# Patient Record
Sex: FEMALE | Race: WHITE | NOT HISPANIC OR LATINO | Employment: OTHER | URBAN - METROPOLITAN AREA
[De-identification: names, ages, dates, MRNs, and addresses within clinical notes are randomized per-mention and may not be internally consistent; named-entity substitution may affect disease eponyms.]

---

## 2018-03-06 ENCOUNTER — LAB (OUTPATIENT)
Dept: OTHER | Facility: HOSPITAL | Age: 63
End: 2018-03-06

## 2018-03-06 ENCOUNTER — CONSULT (OUTPATIENT)
Dept: ONCOLOGY | Facility: CLINIC | Age: 63
End: 2018-03-06

## 2018-03-06 VITALS
BODY MASS INDEX: 22.28 KG/M2 | WEIGHT: 164.5 LBS | SYSTOLIC BLOOD PRESSURE: 151 MMHG | DIASTOLIC BLOOD PRESSURE: 80 MMHG | TEMPERATURE: 99.2 F | RESPIRATION RATE: 14 BRPM | HEIGHT: 72 IN | OXYGEN SATURATION: 97 % | HEART RATE: 85 BPM

## 2018-03-06 DIAGNOSIS — R89.9 ABNORMAL LABORATORY TEST: Primary | ICD-10-CM

## 2018-03-06 DIAGNOSIS — Z85.3 HISTORY OF BREAST CANCER: Primary | ICD-10-CM

## 2018-03-06 LAB
BASOPHILS # BLD AUTO: 0.04 10*3/MM3 (ref 0–0.2)
BASOPHILS NFR BLD AUTO: 0.4 % (ref 0–1.5)
DEPRECATED RDW RBC AUTO: 42.2 FL (ref 37–54)
EOSINOPHIL # BLD AUTO: 0.14 10*3/MM3 (ref 0–0.7)
EOSINOPHIL NFR BLD AUTO: 1.5 % (ref 0.3–6.2)
ERYTHROCYTE [DISTWIDTH] IN BLOOD BY AUTOMATED COUNT: 12.7 % (ref 11.7–13)
HCT VFR BLD AUTO: 46.5 % (ref 35.6–45.5)
HGB BLD-MCNC: 15.8 G/DL (ref 11.9–15.5)
IMM GRANULOCYTES # BLD: 0.06 10*3/MM3 (ref 0–0.03)
IMM GRANULOCYTES NFR BLD: 0.7 % (ref 0–0.5)
LYMPHOCYTES # BLD AUTO: 2.27 10*3/MM3 (ref 0.9–4.8)
LYMPHOCYTES NFR BLD AUTO: 24.8 % (ref 19.6–45.3)
MCH RBC QN AUTO: 30.8 PG (ref 26.9–32)
MCHC RBC AUTO-ENTMCNC: 34 G/DL (ref 32.4–36.3)
MCV RBC AUTO: 90.6 FL (ref 80.5–98.2)
MONOCYTES # BLD AUTO: 0.68 10*3/MM3 (ref 0.2–1.2)
MONOCYTES NFR BLD AUTO: 7.4 % (ref 5–12)
NEUTROPHILS # BLD AUTO: 5.97 10*3/MM3 (ref 1.9–8.1)
NEUTROPHILS NFR BLD AUTO: 65.2 % (ref 42.7–76)
NRBC BLD MANUAL-RTO: 0 /100 WBC (ref 0–0)
PLATELET # BLD AUTO: 240 10*3/MM3 (ref 140–500)
PMV BLD AUTO: 10.9 FL (ref 6–12)
RBC # BLD AUTO: 5.13 10*6/MM3 (ref 3.9–5.2)
WBC NRBC COR # BLD: 9.16 10*3/MM3 (ref 4.5–10.7)

## 2018-03-06 PROCEDURE — 36415 COLL VENOUS BLD VENIPUNCTURE: CPT

## 2018-03-06 PROCEDURE — 85025 COMPLETE CBC W/AUTO DIFF WBC: CPT | Performed by: INTERNAL MEDICINE

## 2018-03-06 PROCEDURE — 99205 OFFICE O/P NEW HI 60 MIN: CPT | Performed by: INTERNAL MEDICINE

## 2018-03-06 RX ORDER — LEVOTHYROXINE SODIUM 88 UG/1
TABLET ORAL
COMMUNITY
Start: 2017-02-13

## 2018-03-06 RX ORDER — ANASTROZOLE 1 MG/1
1 TABLET ORAL DAILY
Qty: 90 TABLET | Refills: 1 | Status: SHIPPED | OUTPATIENT
Start: 2018-03-06 | End: 2018-10-03 | Stop reason: SINTOL

## 2018-03-06 RX ORDER — GABAPENTIN 300 MG/1
CAPSULE ORAL
COMMUNITY
Start: 2017-02-13

## 2018-03-06 RX ORDER — ASCORBIC ACID 1000 MG
TABLET ORAL
COMMUNITY

## 2018-03-06 RX ORDER — RAMIPRIL 2.5 MG/1
CAPSULE ORAL
COMMUNITY
Start: 2017-12-25

## 2018-03-06 RX ORDER — ANASTROZOLE 1 MG/1
1 TABLET ORAL
COMMUNITY
Start: 2017-05-10 | End: 2018-03-06 | Stop reason: SDUPTHER

## 2018-03-06 RX ORDER — BISOPROLOL FUMARATE 10 MG/1
TABLET, FILM COATED ORAL
COMMUNITY
Start: 2017-12-25

## 2018-03-06 RX ORDER — MONTELUKAST SODIUM 10 MG/1
TABLET ORAL
COMMUNITY
Start: 2017-02-13

## 2018-03-06 RX ORDER — DULOXETIN HYDROCHLORIDE 60 MG/1
CAPSULE, DELAYED RELEASE ORAL
COMMUNITY
Start: 2017-04-14 | End: 2018-10-03 | Stop reason: SDUPTHER

## 2018-03-06 RX ORDER — CYANOCOBALAMIN 1000 UG/ML
1000 INJECTION, SOLUTION INTRAMUSCULAR; SUBCUTANEOUS
COMMUNITY
Start: 2014-06-24

## 2018-03-06 RX ORDER — BACLOFEN 10 MG/1
TABLET ORAL
COMMUNITY
Start: 2017-12-25

## 2018-03-06 NOTE — PROGRESS NOTES
Subjective     REASON FOR CONSULTATION:  Potential transfer care-stage I breast cancer  Provide an opinion on any further workup or treatment                             REQUESTING PHYSICIAN:  Sandra Horta M.D.    RECORDS OBTAINED:  Records of the patients history including those obtained from the referring provider were reviewed and summarized in detail.    HISTORY OF PRESENT ILLNESS:  The patient is a 62 y.o. year old female who is here for an opinion about the above issue.    History of Present Illness      The patient is a 62-year-old female who presents for reevaluation of a history of early stage breast carcinoma.  She undergone studies in late January 2017 findings of a focal asymmetry in the upper outer quadrant of the right breast.  On February 07, 2017 diagnostic mammogram and ultrasound were performed and right breast core biopsy was obtained with invasive grade 2 mammary carcinoma (scirrhous type) focally with lobular pattern infiltration without associated in situ component, maximum extent was 12 mm ER +90%, SC at 90%, Ki-67 of 2% HER-2 negative.  The tumor morphology was consistent with breast carcinoma arising in a scar.  She had subsequent bilateral breast MRI showing irregular mass in the upper outer quadrant of the right breast measuring 15 x 11 x 18 mm, no other evidence of involvement present, no abnormalities on the left  March 31, 2017 she underwent a right breast lumpectomy with sentinel lymph node biopsy.  Final pathology included a 15 mm infiltrating moderately differentiated ductal adenocarcinoma without in situ component, margins negative, 2 of 2 lymph nodes negative for carcinoma-tumor stage [id number].  She was seen by medical oncology and further Oncotype DX testing returned with a low score of 13 with anastrozole initiated May 2017 and radiation therapy completed June 26, 2017.    The patient was advised per the use of Oncotype DX, Arimidex thereafter initiated May 2017.  Additional  comorbidities included fibromyalgia, long-standing neuropathy, general debilitation as well as obesity.  Further testing included bone density of lumbar spine and bilateral hips performed 2017 with average values and no evidence of osteopenia or osteoporosis noted.  Additionally the patient underwent ipayckxvikq-smslfulgf-Mbhyivye 02, 2018 without new abnormalities noted.    She presents wishing, potentially, to transfer care.    Past Medical History:   Diagnosis Date   • Arthritis    • Cancer 2017    Right breast   • Fibromyalgia    • GERD (gastroesophageal reflux disease)    • H/O Colon polyp    • H/O Disorder of bile duct    • H/O Intractable migraine without aura    • H/O MVP (mitral valve prolapse), mild    • H/O Nonalcoholic fatty liver disease    • H/O Obesity    • H/O Shingles 2013    Right labial   • H/O Tachycardia    • Hiatal hernia    • Hyperlipidemia    • Hypertension    • Hypothyroidism    • Neuropathy    • PONV (postoperative nausea and vomiting)        Reproductive history:  , miscarriage at age 17, first menstrual period at age 11, postmenopausal age 43, positive hormone replacement therapy, prolonged course of vaginal estrogen  Past Surgical History:   Procedure Laterality Date   • APPENDECTOMY     • BACK SURGERY      Tumor   • BREAST SURGERY Right     Cyst excision   • BREAST SURGERY Right     Aspiration   • BREAST SURGERY Right 2017    Lumpectomy   • CHOLECYSTECTOMY     • COLONOSCOPY       normal; 2015   • ENDOSCOPY     • WISDOM TOOTH EXTRACTION     • WRIST SURGERY          No current outpatient prescriptions on file prior to visit.     No current facility-administered medications on file prior to visit.         ALLERGIES:    Allergies   Allergen Reactions   • Furosemide Hives and Swelling   • Sulfa Antibiotics Hives and Swelling   • Topiramate Hives and Swelling   • Adhesive Tape Rash     PAPER TAPE OKAY PER PT.   • Hydrochlorothiazide Rash   • Shellfish-Derived  "Products Rash        Social History     Social History   • Marital status: Single     Social History Main Topics   • Smoking status: Former Smoker     Packs/day: 1.00     Years: 10.00     Types: Cigarettes     Quit date: 3/23/1982   • Smokeless tobacco: Never Used        Family History   Problem Relation Age of Onset   • Diabetes Father    • Heart disease Father    • Hypertension Father    • Colon cancer Maternal Aunt    • Thyroid disease Paternal Uncle    • Breast cancer Maternal Grandmother         Review of Systems   Constitutional: Negative for chills, fever, malaise/fatigue and weight loss.   HENT: Negative for congestion and sore throat.   Eyes: Negative for blurred vision and double vision.   Respiratory: Negative for cough, shortness of breath and wheezing.   Cardiovascular: Negative for chest pain, orthopnea and leg swelling.   Gastrointestinal: Negative for abdominal pain, nausea and vomiting.   Genitourinary: Negative for dysuria, frequency and urgency.   Musculoskeletal: Positive for back pain, myalgias and neck pain.   Skin: Erythematous,?  Heat rash under both breasts.                          Neurological: Negative for dizziness, sensory change, focal weakness and headaches.   Endo/Heme/Allergies: Does not bruise/bleed easily.   Psychiatric/Behavioral: Negative for depression. The patient is mildly nervous/anxious.     Objective     Vitals:    03/06/18 1514   BP: 151/80   Pulse: 85   Resp: 14   Temp: 99.2 °F (37.3 °C)   TempSrc: Oral   SpO2: 97%   Weight: 74.6 kg (164 lb 8 oz)   Height: 190.3 cm (74.92\")   PainSc: 2  Comment: BACK PAIN     Current Status 3/6/2018   ECOG score 0       Physical Exam    Constitutional: She is oriented to person, place, and time. She appears well-developed and well-nourished. No distress.   Animated  female in no distress   HENT:   Head: Normocephalic and atraumatic.   Eyes: Conjunctivae are normal. Pupils are equal, round, and reactive to light. No scleral " icterus.   Neck: Normal range of motion. No tracheal deviation present.   Pulmonary/Chest: Effort normal.   Breasts: Erythematous, macular rash under each breast bilaterally right greater than left   Musculoskeletal: Normal range of motion. She exhibits no edema or tenderness.   Lymphadenopathy:   She has no cervical adenopathy.   Neurological: She is alert and oriented to person, place, and time. No cranial nerve deficit.   Skin: Skin is warm and dry. No rash noted. No erythema    RECENT LABS:  Hematology WBC   Date Value Ref Range Status   03/06/2018 9.16 4.50 - 10.70 10*3/mm3 Final     RBC   Date Value Ref Range Status   03/06/2018 5.13 3.90 - 5.20 10*6/mm3 Final     Hemoglobin   Date Value Ref Range Status   03/06/2018 15.8 (H) 11.9 - 15.5 g/dL Final     Hematocrit   Date Value Ref Range Status   03/06/2018 46.5 (H) 35.6 - 45.5 % Final     Platelets   Date Value Ref Range Status   03/06/2018 240 140 - 500 10*3/mm3 Final          Assessment/Plan   ASSESSMENT:62-year-old female who presents with past medical history inclusive of GE reflux, apparent bile duct disorder, apparent ANTUNEZ, obesity, hyperlipidemia, hypertension, hypothyroidism, fibromyalgia and more recent diagnosis in 2017 -  · Stage I (T1CN 0) hormone receptor positive, HER-2/jerome negative right breast cancer. The patient was treated with breast conserving surgical procedure and subsequent radiotherapy.  Subsequent Oncotype DX testing turned a low risk score of 13 and she was treated with Arimidex initiated May 2017.  She presents for potential transfer care and we've discussed several issues including antifungal treatment for under breast rash bilaterally, continued use of Arimidex at this point but she is having apparent development of muscle skeletal discomfort that may well be related to the medications use and also estrogen deprivation symptoms in particular vaginal dryness . I believe that she's been treated properly with Arimidex at this point and  have asked her to continue the medication and we may change to an alternative therapy if her muscle several pain worsens.  As to her vaginal dryness I have no objection to the use of vaginal estrogen therapy. As I understand thee issue data do not show an increased risk of cancer recurrence among women currently undergoing treatment for breast cancer or those with a personal history of breast cancer who use vaginal estrogen to relieve urogenital symptoms.  She is to see gynecology concerning this in the next 1-2 months and will need to undergo an examination initially.  After discussion approximately an hour we plan:  1.  Continue her current Arimidex dosing  2.  Follow-up GYN assessment in 1-2 months for a scheduled  3.  The patient will contact me if her musculoskeletal symptoms worsen prior to next appointment  4.  Last bone density was August 2017 without evidence of osteopenia or osteoporosis  5.  Follow-up appointment in 3 months with CBC, CMP

## 2018-04-18 ENCOUNTER — DOCUMENTATION (OUTPATIENT)
Dept: ONCOLOGY | Facility: CLINIC | Age: 63
End: 2018-04-18

## 2018-04-18 NOTE — PROGRESS NOTES
The pt was seen at [place] on 3/6/18 by Dr. Mello for an initial medical oncology consultation for treated stage 1 breast cancer. The pt potentially wished to transfer care to [facility]. The pt completed the Distress Questionnaire and scored 1/10. Currently, the pt does not have a living will scanned into her medical record.     Cari Bradford [facility]  Oncology Social Worker

## 2018-05-28 NOTE — PROGRESS NOTES
Subjective still having difficulty with vaginal dryness and now with hot flashes.    REASON FOR CONSULTATION:  Potential transfer care-stage I breast cancer  Provide an opinion on any further workup or treatment                             REQUESTING PHYSICIAN:  Sandra Horta M.D.      History of Present Illness      The patient is a 62-year-old female who presents for reevaluation of a history of early stage breast carcinoma.  She undergone studies in late January 2017 findings of a focal asymmetry in the upper outer quadrant of the right breast.  On February 07, 2017 diagnostic mammogram and ultrasound were performed and right breast core biopsy was obtained with invasive grade 2 mammary carcinoma (scirrhous type) focally with lobular pattern infiltration without associated in situ component, maximum extent was 12 mm ER +90%, NM at 90%, Ki-67 of 2% HER-2 negative.  The tumor morphology was consistent with breast carcinoma arising in a scar.  She had subsequent bilateral breast MRI showing irregular mass in the upper outer quadrant of the right breast measuring 15 x 11 x 18 mm, no other evidence of involvement present, no abnormalities on the left  March 31, 2017 she underwent a right breast lumpectomy with sentinel lymph node biopsy.  Final pathology included a 15 mm infiltrating moderately differentiated ductal adenocarcinoma without in situ component, margins negative, 2 of 2 lymph nodes negative for carcinoma-tumor stage [id number].  She was seen by medical oncology and further Oncotype DX testing returned with a low score of 13 with anastrozole initiated May 2017 and radiation therapy completed June 26, 2017.    The patient was advised per the use of Oncotype DX, Arimidex thereafter initiated May 2017.  Additional comorbidities included fibromyalgia, long-standing neuropathy, general debilitation as well as obesity.  Further testing included bone density of lumbar spine and bilateral hips performed August  2017 with average values and no evidence of osteopenia or osteoporosis noted.  Additionally the patient underwent roptfjbzbag-jqtbywzvb-Tygazlab 02, 2018 without new abnormalities noted.    She presents wishing, potentially, to transfer care.    Patient now presents doing well except for hot flashes on Arimidex.  She is also having issues, continually, with vaginal dryness.  She has just started vaginal estrogens and hopes for better effect as she uses samples from her gynecologist.  She believes her hot flashes are reasonably manageable thus far    Past Medical History:   Diagnosis Date   • Arthritis    • Cancer 2017    Right breast   • Fibromyalgia    • GERD (gastroesophageal reflux disease)    • H/O Colon polyp    • H/O Disorder of bile duct    • H/O Intractable migraine without aura    • H/O MVP (mitral valve prolapse), mild    • H/O Nonalcoholic fatty liver disease    • H/O Obesity    • H/O Shingles 2013    Right labial   • H/O Tachycardia    • Hiatal hernia    • History of breast cancer 3/6/2018   • History of infectious mononucleosis    • Hyperlipidemia    • Hypertension    • Hypothyroidism    • Neuropathy    • PONV (postoperative nausea and vomiting)        Reproductive history:  , miscarriage at age 17, first menstrual period at age 11, postmenopausal age 43, positive hormone replacement therapy, prolonged course of vaginal estrogen  Past Surgical History:   Procedure Laterality Date   • APPENDECTOMY     • BACK SURGERY      Tumor   • BREAST SURGERY Right     Cyst excision   • BREAST SURGERY Right     Aspiration   • BREAST SURGERY Right 2017    Lumpectomy   • CHOLECYSTECTOMY     • COLONOSCOPY       normal; 2015   • ENDOSCOPY     • WISDOM TOOTH EXTRACTION     • WRIST SURGERY          Current Outpatient Prescriptions on File Prior to Visit   Medication Sig Dispense Refill   • anastrozole (ARIMIDEX) 1 MG tablet Take 1 tablet by mouth Daily. 90 tablet 1   • baclofen (LIORESAL) 10 MG  tablet TAKE 1 TABLET BY MOUTH THREE TIMES DAILY     • bisoprolol (ZEBeta) 10 MG tablet TAKE 1 TABLET BY MOUTH DAILY     • Coenzyme Q10 (CO Q 10) 10 MG capsule Take  by mouth.     • cyanocobalamin 1000 MCG/ML injection Inject 1,000 mcg under the skin.     • DULoxetine (CYMBALTA) 60 MG capsule TAKE 1 CAPSULE BY MOUTH EVERY DAY     • gabapentin (NEURONTIN) 300 MG capsule TAKE 1 CAPSULE BY MOUTH TWICE DAILY     • levothyroxine (SYNTHROID, LEVOTHROID) 88 MCG tablet TAKE 1 TABLET BY MOUTH EVERY DAY     • MAGNESIUM PO Take  by mouth.     • montelukast (SINGULAIR) 10 MG tablet TAKE 1 TABLET BY MOUTH EVERY NIGHT AT BEDTIME     • Multiple Vitamins-Minerals (WOMENS MULTIVITAMIN PO) Take  by mouth Daily.     • NON FORMULARY Omega 7     • Omega-3 Fatty Acids (OMEGA 3 PO) Take  by mouth Daily.     • Probiotic Product (PROBIOTIC DAILY PO) Take  by mouth.     • ramipril (ALTACE) 2.5 MG capsule TAKE 1 CAPSULE BY MOUTH EVERY DAY       No current facility-administered medications on file prior to visit.         ALLERGIES:    Allergies   Allergen Reactions   • Furosemide Hives and Swelling   • Sulfa Antibiotics Hives and Swelling   • Topiramate Hives and Swelling   • Adhesive Tape Rash     PAPER TAPE OKAY PER PT.   • Hydrochlorothiazide Rash   • Shellfish-Derived Products Rash        Social History     Social History   • Marital status: Single   • Number of children: 0   • Years of education: Associate Degree, 2 yrs     Occupational History   • ; ; Dental Assistant//  Disabled     Social History Main Topics   • Smoking status: Former Smoker     Packs/day: 1.00     Years: 10.00     Types: Cigarettes     Quit date: 3/23/1982   • Smokeless tobacco: Never Used   • Alcohol use No   • Drug use: No     Other Topics Concern   • Not on file        Family History   Problem Relation Age of Onset   • Breast cancer Mother         In her 50s and 80s   • Diabetes Mother         Type 2   •  "Dementia Mother    • Diabetes Father         Type 2   • Heart disease Father    • Hypertension Father    • Colon cancer Maternal Aunt    • Thyroid disease Paternal Uncle    • Breast cancer Maternal Grandmother    • Cancer Paternal Grandmother    • Dementia Paternal Grandmother         Review of Systems   Constitutional: Negative for chills, fever, malaise/fatigue and weight loss.   HENT: Negative for congestion and sore throat.   Eyes: Negative for blurred vision and double vision.   Respiratory: Negative for cough, shortness of breath and wheezing.   Cardiovascular: Negative for chest pain, orthopnea and leg swelling.   Gastrointestinal: Negative for abdominal pain, nausea and vomiting.   Genitourinary: Negative for dysuria, frequency and urgency.   Musculoskeletal: Positive for back pain, myalgias and neck pain.   Skin: Erythematous,?  Heat rash under both breasts.                          Neurological: Negative for dizziness, sensory change, focal weakness and headaches.   Endo/Heme/Allergies: Does not bruise/bleed easily.   Psychiatric/Behavioral: Negative for depression. The patient is mildly nervous/anxious.     Objective     Vitals:    05/29/18 1639   BP: 144/85   Pulse: 69   Resp: 16   Temp: 99 °F (37.2 °C)   TempSrc: Oral   SpO2: 99%   Weight: 83 kg (183 lb)   Height: 190.3 cm (74.92\")   PainSc: 0-No pain     Current Status 5/29/2018   ECOG score 0       Physical Exam    Constitutional: She is oriented to person, place, and time. She appears well-developed and well-nourished. No distress.   Animated  female in no distress   HENT:   Head: Normocephalic and atraumatic.   Eyes: Conjunctivae are normal. Pupils are equal, round, and reactive to light. No scleral icterus.   Neck: Normal range of motion. No tracheal deviation present.   Pulmonary/Chest: Effort normal.   Breasts: Erythematous, macular rash under each breast bilaterally right greater than left   Musculoskeletal: Normal range of motion. She " exhibits no edema or tenderness.   Lymphadenopathy:   She has no cervical adenopathy.   Neurological: She is alert and oriented to person, place, and time. No cranial nerve deficit.   Skin: Skin is warm and dry. No rash noted. No erythema    RECENT LABS:  Hematology WBC   Date Value Ref Range Status   05/29/2018 7.40 4.50 - 10.70 10*3/mm3 Final     RBC   Date Value Ref Range Status   05/29/2018 4.88 3.90 - 5.20 10*6/mm3 Final     Hemoglobin   Date Value Ref Range Status   05/29/2018 14.9 11.9 - 15.5 g/dL Final     Hematocrit   Date Value Ref Range Status   05/29/2018 44.5 35.6 - 45.5 % Final     Platelets   Date Value Ref Range Status   05/29/2018 245 140 - 500 10*3/mm3 Final          Assessment/Plan   ASSESSMENT:62-year-old female who presents with past medical history inclusive of GE reflux, apparent bile duct disorder, apparent ANTUNEZ, obesity, hyperlipidemia, hypertension, hypothyroidism, fibromyalgia and more recent diagnosis in 2017 -  · Stage I (T1CN 0) hormone receptor positive, HER-2/jerome negative right breast cancer. The patient was treated with breast conserving surgical procedure and subsequent radiotherapy.  Subsequent Oncotype DX testing turned a low risk score of 13 and she was treated with Arimidex initiated May 2017.  She presents for potential transfer care and we've discussed several issues including antifungal treatment for under breast rash bilaterally, continued use of Arimidex at this point but she is having apparent development of muscle skeletal discomfort that may well be related to the medications use and also estrogen deprivation symptoms in particular vaginal dryness . I believe that she's been treated properly with Arimidex at this point and have asked her to continue the medication and we may change to an alternative therapy if her muscle several pain worsens.  As to her vaginal dryness I have no objection to the use of vaginal estrogen therapy. As I understand thee issue data do not show  an increased risk of cancer recurrence among women currently undergoing treatment for breast cancer or those with a personal history of breast cancer who use vaginal estrogen to relieve urogenital symptoms.  She is to see gynecology concerning this in the next 1-2 months and will need to undergo an examination initially.  After discussion approximately an hour we went on to continue her Arimidex dosing and have her seen by by gynecology.  She is currently going to continue a trial of vaginal estrogens and encouraged her to obtain more samples as cost seems to be prohibitive.  She is tolerating Arimidex fairly well and will continue same with her last bone density obtained August 2017.  She'll not be seen back in 4 months.

## 2018-05-29 ENCOUNTER — OFFICE VISIT (OUTPATIENT)
Dept: ONCOLOGY | Facility: CLINIC | Age: 63
End: 2018-05-29

## 2018-05-29 ENCOUNTER — LAB (OUTPATIENT)
Dept: OTHER | Facility: HOSPITAL | Age: 63
End: 2018-05-29

## 2018-05-29 VITALS
HEIGHT: 72 IN | DIASTOLIC BLOOD PRESSURE: 85 MMHG | WEIGHT: 183 LBS | HEART RATE: 69 BPM | SYSTOLIC BLOOD PRESSURE: 144 MMHG | BODY MASS INDEX: 24.79 KG/M2 | OXYGEN SATURATION: 99 % | RESPIRATION RATE: 16 BRPM | TEMPERATURE: 99 F

## 2018-05-29 DIAGNOSIS — Z85.3 HISTORY OF BREAST CANCER: Primary | ICD-10-CM

## 2018-05-29 DIAGNOSIS — Z85.3 HISTORY OF BREAST CANCER: ICD-10-CM

## 2018-05-29 LAB
ALBUMIN SERPL-MCNC: 4.5 G/DL (ref 3.5–5.2)
ALBUMIN/GLOB SERPL: 1.5 G/DL
ALP SERPL-CCNC: 143 U/L (ref 39–117)
ALT SERPL W P-5'-P-CCNC: 64 U/L (ref 1–33)
ANION GAP SERPL CALCULATED.3IONS-SCNC: 12 MMOL/L
AST SERPL-CCNC: 41 U/L (ref 1–32)
BASOPHILS # BLD AUTO: 0.03 10*3/MM3 (ref 0–0.2)
BASOPHILS NFR BLD AUTO: 0.4 % (ref 0–1.5)
BILIRUB SERPL-MCNC: 0.3 MG/DL (ref 0.1–1.2)
BUN BLD-MCNC: 13 MG/DL (ref 8–23)
BUN/CREAT SERPL: 18.6 (ref 7–25)
CALCIUM SPEC-SCNC: 10.1 MG/DL (ref 8.6–10.5)
CHLORIDE SERPL-SCNC: 106 MMOL/L (ref 98–107)
CO2 SERPL-SCNC: 22 MMOL/L (ref 22–29)
CREAT BLD-MCNC: 0.7 MG/DL (ref 0.57–1)
DEPRECATED RDW RBC AUTO: 42.1 FL (ref 37–54)
EOSINOPHIL # BLD AUTO: 0.12 10*3/MM3 (ref 0–0.7)
EOSINOPHIL NFR BLD AUTO: 1.6 % (ref 0.3–6.2)
ERYTHROCYTE [DISTWIDTH] IN BLOOD BY AUTOMATED COUNT: 12.6 % (ref 11.7–13)
GFR SERPL CREATININE-BSD FRML MDRD: 85 ML/MIN/1.73
GLOBULIN UR ELPH-MCNC: 3.1 GM/DL
GLUCOSE BLD-MCNC: 99 MG/DL (ref 65–99)
HCT VFR BLD AUTO: 44.5 % (ref 35.6–45.5)
HGB BLD-MCNC: 14.9 G/DL (ref 11.9–15.5)
IMM GRANULOCYTES # BLD: 0.04 10*3/MM3 (ref 0–0.03)
IMM GRANULOCYTES NFR BLD: 0.5 % (ref 0–0.5)
LYMPHOCYTES # BLD AUTO: 1.89 10*3/MM3 (ref 0.9–4.8)
LYMPHOCYTES NFR BLD AUTO: 25.5 % (ref 19.6–45.3)
MCH RBC QN AUTO: 30.5 PG (ref 26.9–32)
MCHC RBC AUTO-ENTMCNC: 33.5 G/DL (ref 32.4–36.3)
MCV RBC AUTO: 91.2 FL (ref 80.5–98.2)
MONOCYTES # BLD AUTO: 0.5 10*3/MM3 (ref 0.2–1.2)
MONOCYTES NFR BLD AUTO: 6.8 % (ref 5–12)
NEUTROPHILS # BLD AUTO: 4.82 10*3/MM3 (ref 1.9–8.1)
NEUTROPHILS NFR BLD AUTO: 65.2 % (ref 42.7–76)
NRBC BLD MANUAL-RTO: 0 /100 WBC (ref 0–0)
PLATELET # BLD AUTO: 245 10*3/MM3 (ref 140–500)
PMV BLD AUTO: 10.8 FL (ref 6–12)
POTASSIUM BLD-SCNC: 4.1 MMOL/L (ref 3.5–5.2)
PROT SERPL-MCNC: 7.6 G/DL (ref 6–8.5)
RBC # BLD AUTO: 4.88 10*6/MM3 (ref 3.9–5.2)
SODIUM BLD-SCNC: 140 MMOL/L (ref 136–145)
WBC NRBC COR # BLD: 7.4 10*3/MM3 (ref 4.5–10.7)

## 2018-05-29 PROCEDURE — 99213 OFFICE O/P EST LOW 20 MIN: CPT | Performed by: INTERNAL MEDICINE

## 2018-05-29 PROCEDURE — 80053 COMPREHEN METABOLIC PANEL: CPT | Performed by: INTERNAL MEDICINE

## 2018-05-29 PROCEDURE — 36415 COLL VENOUS BLD VENIPUNCTURE: CPT

## 2018-05-29 PROCEDURE — 85025 COMPLETE CBC W/AUTO DIFF WBC: CPT | Performed by: INTERNAL MEDICINE

## 2018-09-26 ENCOUNTER — APPOINTMENT (OUTPATIENT)
Dept: OTHER | Facility: HOSPITAL | Age: 63
End: 2018-09-26

## 2018-10-03 ENCOUNTER — OFFICE VISIT (OUTPATIENT)
Dept: ONCOLOGY | Facility: CLINIC | Age: 63
End: 2018-10-03

## 2018-10-03 ENCOUNTER — LAB (OUTPATIENT)
Dept: OTHER | Facility: HOSPITAL | Age: 63
End: 2018-10-03

## 2018-10-03 VITALS
HEIGHT: 72 IN | TEMPERATURE: 98.7 F | RESPIRATION RATE: 16 BRPM | HEART RATE: 81 BPM | OXYGEN SATURATION: 96 % | BODY MASS INDEX: 25.22 KG/M2 | WEIGHT: 186.2 LBS | SYSTOLIC BLOOD PRESSURE: 140 MMHG | DIASTOLIC BLOOD PRESSURE: 90 MMHG

## 2018-10-03 DIAGNOSIS — Z85.3 HISTORY OF BREAST CANCER: Primary | ICD-10-CM

## 2018-10-03 DIAGNOSIS — R79.9 ABNORMAL FINDING OF BLOOD CHEMISTRY: ICD-10-CM

## 2018-10-03 LAB
ALBUMIN SERPL-MCNC: 4.1 G/DL (ref 3.5–5.2)
ALBUMIN/GLOB SERPL: 1.3 G/DL
ALP SERPL-CCNC: 160 U/L (ref 39–117)
ALT SERPL W P-5'-P-CCNC: 47 U/L (ref 1–33)
ANION GAP SERPL CALCULATED.3IONS-SCNC: 12.8 MMOL/L
AST SERPL-CCNC: 32 U/L (ref 1–32)
BASOPHILS # BLD AUTO: 0.03 10*3/MM3 (ref 0–0.2)
BASOPHILS NFR BLD AUTO: 0.4 % (ref 0–1.5)
BILIRUB SERPL-MCNC: 0.3 MG/DL (ref 0.1–1.2)
BUN BLD-MCNC: 16 MG/DL (ref 8–23)
BUN/CREAT SERPL: 19.3 (ref 7–25)
CALCIUM SPEC-SCNC: 10 MG/DL (ref 8.6–10.5)
CHLORIDE SERPL-SCNC: 102 MMOL/L (ref 98–107)
CO2 SERPL-SCNC: 25.2 MMOL/L (ref 22–29)
CREAT BLD-MCNC: 0.83 MG/DL (ref 0.57–1)
DEPRECATED RDW RBC AUTO: 43.7 FL (ref 37–54)
EOSINOPHIL # BLD AUTO: 0.19 10*3/MM3 (ref 0–0.7)
EOSINOPHIL NFR BLD AUTO: 2.6 % (ref 0.3–6.2)
ERYTHROCYTE [DISTWIDTH] IN BLOOD BY AUTOMATED COUNT: 12.9 % (ref 11.7–13)
GFR SERPL CREATININE-BSD FRML MDRD: 69 ML/MIN/1.73
GLOBULIN UR ELPH-MCNC: 3.2 GM/DL
GLUCOSE BLD-MCNC: 96 MG/DL (ref 65–99)
HCT VFR BLD AUTO: 43.9 % (ref 35.6–45.5)
HGB BLD-MCNC: 14.3 G/DL (ref 11.9–15.5)
IMM GRANULOCYTES # BLD: 0.04 10*3/MM3 (ref 0–0.03)
IMM GRANULOCYTES NFR BLD: 0.6 % (ref 0–0.5)
LYMPHOCYTES # BLD AUTO: 1.95 10*3/MM3 (ref 0.9–4.8)
LYMPHOCYTES NFR BLD AUTO: 27 % (ref 19.6–45.3)
MCH RBC QN AUTO: 30.1 PG (ref 26.9–32)
MCHC RBC AUTO-ENTMCNC: 32.6 G/DL (ref 32.4–36.3)
MCV RBC AUTO: 92.4 FL (ref 80.5–98.2)
MONOCYTES # BLD AUTO: 0.55 10*3/MM3 (ref 0.2–1.2)
MONOCYTES NFR BLD AUTO: 7.6 % (ref 5–12)
NEUTROPHILS # BLD AUTO: 4.45 10*3/MM3 (ref 1.9–8.1)
NEUTROPHILS NFR BLD AUTO: 61.8 % (ref 42.7–76)
NRBC BLD MANUAL-RTO: 0 /100 WBC (ref 0–0)
PLATELET # BLD AUTO: 251 10*3/MM3 (ref 140–500)
PMV BLD AUTO: 11.1 FL (ref 6–12)
POTASSIUM BLD-SCNC: 3.9 MMOL/L (ref 3.5–5.2)
PROT SERPL-MCNC: 7.3 G/DL (ref 6–8.5)
RBC # BLD AUTO: 4.75 10*6/MM3 (ref 3.9–5.2)
SODIUM BLD-SCNC: 140 MMOL/L (ref 136–145)
WBC NRBC COR # BLD: 7.21 10*3/MM3 (ref 4.5–10.7)

## 2018-10-03 PROCEDURE — 99214 OFFICE O/P EST MOD 30 MIN: CPT | Performed by: INTERNAL MEDICINE

## 2018-10-03 PROCEDURE — 80053 COMPREHEN METABOLIC PANEL: CPT | Performed by: INTERNAL MEDICINE

## 2018-10-03 PROCEDURE — 85025 COMPLETE CBC W/AUTO DIFF WBC: CPT | Performed by: INTERNAL MEDICINE

## 2018-10-03 PROCEDURE — 36415 COLL VENOUS BLD VENIPUNCTURE: CPT

## 2018-10-03 RX ORDER — DULOXETIN HYDROCHLORIDE 30 MG/1
30 CAPSULE, DELAYED RELEASE ORAL DAILY
Qty: 30 CAPSULE | Refills: 2 | Status: SHIPPED | OUTPATIENT
Start: 2018-10-03 | End: 2019-01-22 | Stop reason: SDUPTHER

## 2018-10-03 RX ORDER — TAMOXIFEN CITRATE 20 MG/1
20 TABLET ORAL DAILY
Qty: 30 TABLET | Refills: 5 | Status: SHIPPED | OUTPATIENT
Start: 2018-10-03 | End: 2018-11-02

## 2018-10-03 NOTE — PROGRESS NOTES
Subjective still having difficulty with vaginal dryness and now with hot flashes and additionally concerned about elevated cholesterol.    REASON FOR CONSULTATION:  Potential transfer care-stage I breast cancer  Provide an opinion on any further workup or treatment                             REQUESTING PHYSICIAN:  Sandra Horta M.D.      History of Present Illness      The patient is a 62-year-old female who presents for reevaluation of a history of early stage breast carcinoma.  She undergone studies in late January 2017 findings of a focal asymmetry in the upper outer quadrant of the right breast.  On February 07, 2017 diagnostic mammogram and ultrasound were performed and right breast core biopsy was obtained with invasive grade 2 mammary carcinoma (scirrhous type) focally with lobular pattern infiltration without associated in situ component, maximum extent was 12 mm ER +90%, KS at 90%, Ki-67 of 2% HER-2 negative.  The tumor morphology was consistent with breast carcinoma arising in a scar.  She had subsequent bilateral breast MRI showing irregular mass in the upper outer quadrant of the right breast measuring 15 x 11 x 18 mm, no other evidence of involvement present, no abnormalities on the left  March 31, 2017 she underwent a right breast lumpectomy with sentinel lymph node biopsy.  Final pathology included a 15 mm infiltrating moderately differentiated ductal adenocarcinoma without in situ component, margins negative, 2 of 2 lymph nodes negative for carcinoma-tumor stage [id number].  She was seen by medical oncology and further Oncotype DX testing returned with a low score of 13 with anastrozole initiated May 2017 and radiation therapy completed June 26, 2017.    The patient was advised per the use of Oncotype DX, Arimidex thereafter initiated May 2017.  Additional comorbidities included fibromyalgia, long-standing neuropathy, general debilitation as well as obesity.  Further testing included bone density  of lumbar spine and bilateral hips performed 2017 with average values and no evidence of osteopenia or osteoporosis noted.  Additionally the patient underwent [id number] 02, 2018 without new abnormalities noted.    She presents wishing, potentially, to transfer care.    Patient now presents doing well except for hot flashes on Arimidex.  She is also having issues, continually, with vaginal dryness.  She has just started vaginal estrogens and hopes for better effect as she uses samples from her gynecologist.  She believes her hot flashes are reasonably manageable thus far.  The patient is next seen 2018 still having the same issues as previous and also described an elevated cholesterol levels.  As result we had a shannan discussion about her overall treatment with the possibility of switching from AI therapy to tamoxifen as a trial.  This may reduce several of her ongoing concerns.    Past Medical History:   Diagnosis Date   • Arthritis    • Cancer (CMS/HCC)     Right breast   • Fibromyalgia    • GERD (gastroesophageal reflux disease)    • H/O Colon polyp    • H/O Disorder of bile duct    • H/O Intractable migraine without aura    • H/O MVP (mitral valve prolapse), mild    • H/O Nonalcoholic fatty liver disease    • H/O Obesity    • H/O Shingles 2013    Right labial   • H/O Tachycardia    • Hiatal hernia    • History of breast cancer 3/6/2018   • History of infectious mononucleosis    • Hyperlipidemia    • Hypertension    • Hypothyroidism    • Neuropathy    • PONV (postoperative nausea and vomiting)        Reproductive history:  , miscarriage at age 17, first menstrual period at age 11, postmenopausal age 43, positive hormone replacement therapy, prolonged course of vaginal estrogen  Past Surgical History:   Procedure Laterality Date   • APPENDECTOMY     • BACK SURGERY      Tumor   • BREAST SURGERY Right     Cyst excision   • BREAST SURGERY Right     Aspiration    • BREAST SURGERY Right 03/2017    Lumpectomy   • CHOLECYSTECTOMY     • COLONOSCOPY      2004 normal; 11/2015   • ENDOSCOPY     • WISDOM TOOTH EXTRACTION     • WRIST SURGERY          Current Outpatient Prescriptions on File Prior to Visit   Medication Sig Dispense Refill   • baclofen (LIORESAL) 10 MG tablet TAKE 1 TABLET BY MOUTH THREE TIMES DAILY     • bisoprolol (ZEBeta) 10 MG tablet TAKE 1 TABLET BY MOUTH DAILY     • Coenzyme Q10 (CO Q 10) 10 MG capsule Take  by mouth.     • cyanocobalamin 1000 MCG/ML injection Inject 1,000 mcg under the skin.     • gabapentin (NEURONTIN) 300 MG capsule TAKE 1 CAPSULE BY MOUTH TWICE DAILY     • levothyroxine (SYNTHROID, LEVOTHROID) 88 MCG tablet TAKE 1 TABLET BY MOUTH EVERY DAY     • MAGNESIUM PO Take  by mouth.     • montelukast (SINGULAIR) 10 MG tablet TAKE 1 TABLET BY MOUTH EVERY NIGHT AT BEDTIME     • Multiple Vitamins-Minerals (WOMENS MULTIVITAMIN PO) Take  by mouth Daily.     • NON FORMULARY Omega 7     • Omega-3 Fatty Acids (OMEGA 3 PO) Take  by mouth Daily.     • Probiotic Product (PROBIOTIC DAILY PO) Take  by mouth.     • ramipril (ALTACE) 2.5 MG capsule TAKE 1 CAPSULE BY MOUTH EVERY DAY     • [DISCONTINUED] anastrozole (ARIMIDEX) 1 MG tablet Take 1 tablet by mouth Daily. 90 tablet 1   • [DISCONTINUED] DULoxetine (CYMBALTA) 60 MG capsule TAKE 1 CAPSULE BY MOUTH EVERY DAY       No current facility-administered medications on file prior to visit.         ALLERGIES:    Allergies   Allergen Reactions   • Furosemide Hives and Swelling   • Sulfa Antibiotics Hives and Swelling   • Topiramate Hives and Swelling   • Adhesive Tape Rash     PAPER TAPE OKAY PER PT.   • Hydrochlorothiazide Rash   • Shellfish-Derived Products Rash        Social History     Social History   • Marital status: Single   • Number of children: 0   • Years of education: Associate Degree, 2 yrs     Occupational History   • ; ; Dental Assistant//   "Disabled     Social History Main Topics   • Smoking status: Former Smoker     Packs/day: 1.00     Years: 10.00     Types: Cigarettes     Quit date: 3/23/1982   • Smokeless tobacco: Never Used   • Alcohol use No   • Drug use: No     Other Topics Concern   • Not on file        Family History   Problem Relation Age of Onset   • Breast cancer Mother         In her 50s and 80s   • Diabetes Mother         Type 2   • Dementia Mother    • Diabetes Father         Type 2   • Heart disease Father    • Hypertension Father    • Colon cancer Maternal Aunt    • Thyroid disease Paternal Uncle    • Breast cancer Maternal Grandmother    • Cancer Paternal Grandmother    • Dementia Paternal Grandmother         Review of Systems   Constitutional: Negative for chills, fever, malaise/fatigue and weight loss.   HENT: Negative for congestion and sore throat.   Eyes: Negative for blurred vision and double vision.   Respiratory: Negative for cough, shortness of breath and wheezing.   Cardiovascular: Negative for chest pain, orthopnea and leg swelling.   Gastrointestinal: Negative for abdominal pain, nausea and vomiting.   Genitourinary: Negative for dysuria, frequency and urgency.   Musculoskeletal: Positive for back pain, myalgias and neck pain.   Skin: Erythematous,?  Heat rash under both breasts.                          Neurological: Negative for dizziness, sensory change, focal weakness and headaches.   Endo/Heme/Allergies: Does not bruise/bleed easily.   Psychiatric/Behavioral: Negative for depression. The patient is mildly nervous/anxious.     Objective     Vitals:    10/03/18 1542   BP: 140/90   Pulse: 81   Resp: 16   Temp: 98.7 °F (37.1 °C)   TempSrc: Oral   SpO2: 96%   Weight: 84.5 kg (186 lb 3.2 oz)   Height: 190.3 cm (74.92\")   PainSc: 0-No pain     Current Status 10/3/2018   ECOG score 0       Physical Exam    Constitutional: She is oriented to person, place, and time. She appears well-developed and well-nourished. No distress. "   Animated  female in no distress   HENT:   Head: Normocephalic and atraumatic.   Eyes: Conjunctivae are normal. Pupils are equal, round, and reactive to light. No scleral icterus.   Neck: Normal range of motion. No tracheal deviation present.   Pulmonary/Chest: Effort normal.   Breasts: Erythematous, macular rash under each breast bilaterally right greater than left   Musculoskeletal: Normal range of motion. She exhibits no edema or tenderness.   Lymphadenopathy:   She has no cervical adenopathy.   Neurological: She is alert and oriented to person, place, and time. No cranial nerve deficit.   Skin: Skin is warm and dry. No rash noted. No erythema    RECENT LABS:  Hematology WBC   Date Value Ref Range Status   10/03/2018 7.21 4.50 - 10.70 10*3/mm3 Final     RBC   Date Value Ref Range Status   10/03/2018 4.75 3.90 - 5.20 10*6/mm3 Final     Hemoglobin   Date Value Ref Range Status   10/03/2018 14.3 11.9 - 15.5 g/dL Final     Hematocrit   Date Value Ref Range Status   10/03/2018 43.9 35.6 - 45.5 % Final     Platelets   Date Value Ref Range Status   10/03/2018 251 140 - 500 10*3/mm3 Final          Assessment/Plan   ASSESSMENT:62-year-old female who presents with past medical history inclusive of GE reflux, apparent bile duct disorder, apparent ANTUNEZ, obesity, hyperlipidemia, hypertension, hypothyroidism, fibromyalgia and more recent diagnosis in 2017 -  · Stage I (T1CN 0) hormone receptor positive, HER-2/jerome negative right breast cancer. The patient was treated with breast conserving surgical procedure and subsequent radiotherapy.  Subsequent Oncotype DX testing turned a low risk score of 13 and she was treated with Arimidex initiated May 2017.  She presents for potential transfer care and we've discussed several issues including antifungal treatment for under breast rash bilaterally, continued use of Arimidex at this point but she is having apparent development of muscle skeletal discomfort that may well be  related to the medications use and also estrogen deprivation symptoms in particular vaginal dryness . I believe that she's been treated properly with Arimidex at this point and have asked her to continue the medication and we may change to an alternative therapy if her muscle several pain worsens.  As to her vaginal dryness I have no objection to the use of vaginal estrogen therapy. As I understand thee issue data do not show an increased risk of cancer recurrence among women currently undergoing treatment for breast cancer or those with a personal history of breast cancer who use vaginal estrogen to relieve urogenital symptoms.  She is to see gynecology concerning this in the next 1-2 months and will need to undergo an examination initially.  After discussion approximately an hour we went on to continue her Arimidex dosing and have her seen by by gynecology.  She is currently going to continue a trial of vaginal estrogens and encouraged her to obtain more samples as cost seems to be prohibitive.  She is tolerating Arimidex fairly well and will continue same with her last bone density obtained August 2017.      The patient seen back October 03, 2018.  She still is having vaginal dryness, hot flashes now hypercholesterolemia.  These issues are secondary to her AI therapy and her prognosis is such that she could do well with tamoxifen treatment.  We will have to adjust her Cymbalta that this would seem reasonable approach.  After much discussion we plan:  *Discontinue Arimidex  *Start tamoxifen 20 mg daily  *Reduce Cymbalta 30 mg daily with both tamoxifen Cymbalta E-scribed to her pharmacy  *Return in 7 weeks for lipid profile, CMP studies  *8 weeks and reassessment.  *The patient and requested potential letter for Premarin cream as needed though this may not be necessary until we switch to tamoxifen trial initially and determine her status at next visit.

## 2018-11-05 ENCOUNTER — TRANSCRIBE ORDERS (OUTPATIENT)
Dept: ADMINISTRATIVE | Facility: HOSPITAL | Age: 63
End: 2018-11-05

## 2018-11-05 DIAGNOSIS — R79.89 ELEVATED LFTS: Primary | ICD-10-CM

## 2018-11-08 ENCOUNTER — HOSPITAL ENCOUNTER (OUTPATIENT)
Dept: ULTRASOUND IMAGING | Facility: HOSPITAL | Age: 63
Discharge: HOME | End: 2018-11-08
Admitting: INTERNAL MEDICINE

## 2018-11-08 DIAGNOSIS — R79.89 ELEVATED LFTS: ICD-10-CM

## 2018-11-08 PROCEDURE — 76705 ECHO EXAM OF ABDOMEN: CPT

## 2018-11-09 NOTE — TELEPHONE ENCOUNTER
[facility] [phone] faxed a request for 90 day supply of Arimidex. Faxing a denial to [phone] bc pt now on Tamoxifen.

## 2018-11-20 ENCOUNTER — LAB (OUTPATIENT)
Dept: OTHER | Facility: HOSPITAL | Age: 63
End: 2018-11-20

## 2018-11-20 DIAGNOSIS — R79.9 ABNORMAL FINDING OF BLOOD CHEMISTRY: ICD-10-CM

## 2018-11-20 DIAGNOSIS — Z85.3 HISTORY OF BREAST CANCER: ICD-10-CM

## 2018-11-20 PROCEDURE — 36415 COLL VENOUS BLD VENIPUNCTURE: CPT

## 2018-11-28 ENCOUNTER — APPOINTMENT (OUTPATIENT)
Dept: OTHER | Facility: HOSPITAL | Age: 63
End: 2018-11-28

## 2018-11-28 ENCOUNTER — OFFICE VISIT (OUTPATIENT)
Dept: ONCOLOGY | Facility: CLINIC | Age: 63
End: 2018-11-28

## 2018-11-28 VITALS
SYSTOLIC BLOOD PRESSURE: 129 MMHG | RESPIRATION RATE: 16 BRPM | HEART RATE: 59 BPM | TEMPERATURE: 98.6 F | DIASTOLIC BLOOD PRESSURE: 75 MMHG | OXYGEN SATURATION: 100 % | BODY MASS INDEX: 29.87 KG/M2 | HEIGHT: 65 IN | WEIGHT: 179.3 LBS

## 2018-11-28 DIAGNOSIS — Z85.3 HISTORY OF BREAST CANCER: Primary | ICD-10-CM

## 2018-11-28 PROCEDURE — G0463 HOSPITAL OUTPT CLINIC VISIT: HCPCS | Performed by: INTERNAL MEDICINE

## 2018-11-28 PROCEDURE — 99214 OFFICE O/P EST MOD 30 MIN: CPT | Performed by: INTERNAL MEDICINE

## 2018-11-28 NOTE — PROGRESS NOTES
Subjective side effects of tamoxifen almost disabling , further evidence of hyperlipidemia, mild LFT abnormalities, ultrasound consistent with that infiltration                                                                                                                                 Reason for Follow-Up :stage I breast cancer                           REQUESTING PHYSICIAN:  Sandra Horta M.D.      History of Present Illness      The patient is a 63-year-old female who presents for reevaluation of a history of early stage breast carcinoma.  She undergone studies in late January 2017 findings of a focal asymmetry in the upper outer quadrant of the right breast.  On February 07, 2017 diagnostic mammogram and ultrasound were performed and right breast core biopsy was obtained with invasive grade 2 mammary carcinoma (scirrhous type) focally with lobular pattern infiltration without associated in situ component, maximum extent was 12 mm ER +90%, RI at 90%, Ki-67 of 2% HER-2 negative.  The tumor morphology was consistent with breast carcinoma arising in a scar.  She had subsequent bilateral breast MRI showing irregular mass in the upper outer quadrant of the right breast measuring 15 x 11 x 18 mm, no other evidence of involvement present, no abnormalities on the left  March 31, 2017 she underwent a right breast lumpectomy with sentinel lymph node biopsy.  Final pathology included a 15 mm infiltrating moderately differentiated ductal adenocarcinoma without in situ component, margins negative, 2 of 2 lymph nodes negative for carcinoma-tumor stage [id number].  She was seen by medical oncology and further Oncotype DX testing returned with a low score of 13 with anastrozole initiated May 2017 and radiation therapy completed June 26, 2017.    The patient was advised per the use of Oncotype DX, Arimidex thereafter initiated May 2017.  Additional comorbidities included fibromyalgia, long-standing neuropathy, general  "debilitation as well as obesity.  Further testing included bone density of lumbar spine and bilateral hips performed August 17, 2017 with average values and no evidence of osteopenia or osteoporosis noted.  Additionally the patient underwent [id number]-[id number] 02, 2018 without new abnormalities noted.    She presents wishing, potentially, to transfer care.    Patient now presents doing well except for hot flashes on Arimidex.  She is also having issues, continually, with vaginal dryness.  She has just started vaginal estrogens and hopes for better effect as she uses samples from her gynecologist.  She believes her hot flashes are reasonably manageable thus far.  The patient is next seen October 03, 2018 still having the same issues as previous and also described an elevated cholesterol levels.  As result we had a shannan discussion about her overall treatment with the possibility of switching from AI therapy to tamoxifen as a trial.  This may reduce several of her ongoing concerns.    This was discussed in detail October 3 and, as result, we went on to Initiate Tamoxifen.  The patient is next seen November 28 indicating, unfortunately, that she is not tolerated this treatment well with excessive hot flashes, interrupted sleep, change in mood, further evidence of hyperlipidemia and \"just feeling terrible\".  LFTs have been recently checked with mild elevation per transaminases and alkaline phosphatase and ultrasound of liver obtained November 8 demonstrates fatty infiltration of the liver.    Past Medical History:   Diagnosis Date   • Arthritis    • Cancer (CMS/HCC) 2017    Right breast   • Fibromyalgia    • GERD (gastroesophageal reflux disease)    • H/O Colon polyp    • H/O Disorder of bile duct    • H/O Intractable migraine without aura 2014   • H/O MVP (mitral valve prolapse), mild    • H/O Nonalcoholic fatty liver disease    • H/O Obesity    • H/O Shingles 07/2013    Right labial   • H/O Tachycardia  "   • Hiatal hernia    • History of breast cancer 3/6/2018   • History of infectious mononucleosis    • Hyperlipidemia    • Hypertension    • Hypothyroidism    • Neuropathy    • PONV (postoperative nausea and vomiting)        Reproductive history:  , miscarriage at age 17, first menstrual period at age 11, postmenopausal age 43, positive hormone replacement therapy, prolonged course of vaginal estrogen  Past Surgical History:   Procedure Laterality Date   • APPENDECTOMY     • BACK SURGERY      Tumor   • BREAST SURGERY Right     Cyst excision   • BREAST SURGERY Right     Aspiration   • BREAST SURGERY Right 2017    Lumpectomy   • CHOLECYSTECTOMY     • COLONOSCOPY       normal; 2015   • ENDOSCOPY     • WISDOM TOOTH EXTRACTION     • WRIST SURGERY          Current Outpatient Medications on File Prior to Visit   Medication Sig Dispense Refill   • baclofen (LIORESAL) 10 MG tablet TAKE 1 TABLET BY MOUTH THREE TIMES DAILY     • bisoprolol (ZEBeta) 10 MG tablet TAKE 1 TABLET BY MOUTH DAILY     • Coenzyme Q10 (CO Q 10) 10 MG capsule Take  by mouth.     • cyanocobalamin 1000 MCG/ML injection Inject 1,000 mcg under the skin.     • DULoxetine (CYMBALTA) 30 MG capsule Take 1 capsule by mouth Daily. 30 capsule 2   • gabapentin (NEURONTIN) 300 MG capsule TAKE 1 CAPSULE BY MOUTH TWICE DAILY     • levothyroxine (SYNTHROID, LEVOTHROID) 88 MCG tablet TAKE 1 TABLET BY MOUTH EVERY DAY     • MAGNESIUM PO Take  by mouth.     • montelukast (SINGULAIR) 10 MG tablet TAKE 1 TABLET BY MOUTH EVERY NIGHT AT BEDTIME     • Multiple Vitamins-Minerals (WOMENS MULTIVITAMIN PO) Take  by mouth Daily.     • NON FORMULARY Omega 7     • Omega-3 Fatty Acids (OMEGA 3 PO) Take  by mouth Daily.     • Probiotic Product (PROBIOTIC DAILY PO) Take  by mouth.     • ramipril (ALTACE) 2.5 MG capsule TAKE 1 CAPSULE BY MOUTH EVERY DAY       No current facility-administered medications on file prior to visit.         ALLERGIES:    Allergies   Allergen  Reactions   • Furosemide Hives and Swelling   • Sulfa Antibiotics Hives and Swelling   • Topiramate Hives and Swelling   • Adhesive Tape Rash     PAPER TAPE OKAY PER PT.   • Hydrochlorothiazide Rash   • Shellfish-Derived Products Rash        Social History     Socioeconomic History   • Marital status: Single     Spouse name: Not on file   • Number of children: 0   • Years of education: Associate Degree, 2 yrs   • Highest education level: Not on file   Occupational History   • Occupation: ; ; Dental Assistant//      Employer: DISABLED   Tobacco Use   • Smoking status: Former Smoker     Packs/day: 1.00     Years: 10.00     Pack years: 10.00     Types: Cigarettes     Last attempt to quit: 3/23/1982     Years since quittin.7   • Smokeless tobacco: Never Used   Substance and Sexual Activity   • Alcohol use: No   • Drug use: No        Family History   Problem Relation Age of Onset   • Breast cancer Mother         In her 50s and 80s   • Diabetes Mother         Type 2   • Dementia Mother    • Diabetes Father         Type 2   • Heart disease Father    • Hypertension Father    • Colon cancer Maternal Aunt    • Thyroid disease Paternal Uncle    • Breast cancer Maternal Grandmother    • Cancer Paternal Grandmother    • Dementia Paternal Grandmother         Review of Systems   Constitutional: Negative for chills, fever, malaise/fatigue and weight loss.   HENT: Negative for congestion and sore throat.   Eyes: Negative for blurred vision and double vision.   Respiratory: Negative for cough, shortness of breath and wheezing.   Cardiovascular: Negative for chest pain, orthopnea and leg swelling.   Gastrointestinal: Negative for abdominal pain, nausea and vomiting.   Genitourinary: Negative for dysuria, frequency and urgency.   Musculoskeletal: Positive for back pain, myalgias and neck pain.   Skin: Erythematous,?  Heat rash under both breasts.                           "Neurological: Negative for dizziness, sensory change, focal weakness and headaches.   Endo/Heme/Allergies: Does not bruise/bleed easily.   Psychiatric/Behavioral: Negative for depression. The patient is mildly nervous/anxious.     Objective     Vitals:    11/28/18 1521   BP: 129/75   Pulse: 59   Resp: 16   Temp: 98.6 °F (37 °C)   TempSrc: Oral   SpO2: 100%   Weight: 81.3 kg (179 lb 4.8 oz)   Height: 163.8 cm (64.5\")   PainSc: 0-No pain     Current Status 11/28/2018   ECOG score 0       Physical Exam    Constitutional: She is oriented to person, place, and time. She appears well-developed and well-nourished. No distress.   Animated  female in no distress   HENT:   Head: Normocephalic and atraumatic.   Eyes: Conjunctivae are normal. Pupils are equal, round, and reactive to light. No scleral icterus.   Neck: Normal range of motion. No tracheal deviation present.   Pulmonary/Chest: Effort normal.   Breasts: Erythematous, macular rash under each breast bilaterally right greater than left   Musculoskeletal: Normal range of motion. She exhibits no edema or tenderness.   Lymphadenopathy:   She has no cervical adenopathy.   Neurological: She is alert and oriented to person, place, and time. No cranial nerve deficit.   Skin: Skin is warm and dry. No rash noted. No erythema    RECENT LABS:  Hematology WBC   Date Value Ref Range Status   10/03/2018 7.21 4.50 - 10.70 10*3/mm3 Final     RBC   Date Value Ref Range Status   10/03/2018 4.75 3.90 - 5.20 10*6/mm3 Final     Hemoglobin   Date Value Ref Range Status   10/03/2018 14.3 11.9 - 15.5 g/dL Final     Hematocrit   Date Value Ref Range Status   10/03/2018 43.9 35.6 - 45.5 % Final     Platelets   Date Value Ref Range Status   10/03/2018 251 140 - 500 10*3/mm3 Final          Assessment/Plan   ASSESSMENT:63-year-old female who presents with past medical history inclusive of GE reflux, apparent bile duct disorder, apparent ANTUNEZ, obesity, hyperlipidemia, hypertension, " hypothyroidism, fibromyalgia and more recent diagnosis in 2017 -  · Stage I (T1CN 0) hormone receptor positive, HER-2/jerome negative right breast cancer. The patient was treated with breast conserving surgical procedure and subsequent radiotherapy.  Subsequent Oncotype DX testing turned a low risk score of 13 and she was treated with Arimidex initiated May 2017.  She presents for potential transfer care and we've discussed several issues including antifungal treatment for under breast rash bilaterally, continued use of Arimidex at this point but she is having apparent development of muscle skeletal discomfort that may well be related to the medications use and also estrogen deprivation symptoms in particular vaginal dryness . I believe that she's been treated properly with Arimidex at this point and have asked her to continue the medication and we may change to an alternative therapy if her muscle several pain worsens.  As to her vaginal dryness I have no objection to the use of vaginal estrogen therapy. As I understand thee issue data do not show an increased risk of cancer recurrence among women currently undergoing treatment for breast cancer or those with a personal history of breast cancer who use vaginal estrogen to relieve urogenital symptoms.  She is to see gynecology concerning this in the next 1-2 months and will need to undergo an examination initially.  After discussion approximately an hour we went on to continue her Arimidex dosing and have her seen by by gynecology.  She is currently going to continue a trial of vaginal estrogens and encouraged her to obtain more samples as cost seems to be prohibitive.  She is tolerating Arimidex fairly well and will continue same with her last bone density obtained August 2017.      The patient seen back October 03, 2018.  She still is having vaginal dryness, hot flashes now hypercholesterolemia.  These issues are secondary to her AI therapy and her prognosis is such  that she could do well with tamoxifen treatment.  We will have to adjust her Cymbalta that this would seem reasonable approach.  After much discussion we went on to discontinue Arimidex and moved to a trial of tamoxifen, trying to reduce Cymbalta and reassess the patient 8 weeks.  As she is reevaluated November 28 Tamoxifen is also not tolerated well and, considering the changes that we'll have to make to continue its use, we've begun to discuss discontinuing anti-hormonal therapy balancing her risk of recurrence versus quality of life.  After much discussion we plan to discontinue tamoxifen or at least the next 3 months as the patient's hyperlipidemia and ANTUNEZ are addressed.  *Discontinue Tamoxifen  *Continue Cymbalta at usual effective dosing  *GI follow-up to be confirmed  *Follow-up appointment in 3 months per M.D. reassessment

## 2018-12-20 ENCOUNTER — OFFICE (OUTPATIENT)
Dept: URBAN - METROPOLITAN AREA CLINIC 66 | Facility: CLINIC | Age: 63
End: 2018-12-20

## 2018-12-20 VITALS
SYSTOLIC BLOOD PRESSURE: 120 MMHG | HEIGHT: 64 IN | HEART RATE: 72 BPM | WEIGHT: 179 LBS | DIASTOLIC BLOOD PRESSURE: 76 MMHG

## 2018-12-20 DIAGNOSIS — K75.81 NONALCOHOLIC STEATOHEPATITIS (NASH): ICD-10-CM

## 2018-12-20 DIAGNOSIS — R94.5 ABNORMAL RESULTS OF LIVER FUNCTION STUDIES: ICD-10-CM

## 2018-12-20 DIAGNOSIS — R53.83 OTHER FATIGUE: ICD-10-CM

## 2018-12-20 PROCEDURE — 99202 OFFICE O/P NEW SF 15 MIN: CPT | Performed by: INTERNAL MEDICINE

## 2018-12-21 LAB
ANTINUCLEAR ANTIBODIES, IFA: POSITIVE
COMP. METABOLIC PANEL (14): A/G RATIO: 2.2 (ref 1.2–2.2)
COMP. METABOLIC PANEL (14): ALBUMIN: 4.8 G/DL (ref 3.6–4.8)
COMP. METABOLIC PANEL (14): ALKALINE PHOSPHATASE: 143 IU/L — HIGH (ref 39–117)
COMP. METABOLIC PANEL (14): ALT (SGPT): 67 IU/L — HIGH (ref 0–32)
COMP. METABOLIC PANEL (14): AST (SGOT): 45 IU/L — HIGH (ref 0–40)
COMP. METABOLIC PANEL (14): BILIRUBIN, TOTAL: 0.5 MG/DL (ref 0–1.2)
COMP. METABOLIC PANEL (14): BUN/CREATININE RATIO: 20 (ref 12–28)
COMP. METABOLIC PANEL (14): BUN: 16 MG/DL (ref 8–27)
COMP. METABOLIC PANEL (14): CALCIUM: 9.6 MG/DL (ref 8.7–10.3)
COMP. METABOLIC PANEL (14): CARBON DIOXIDE, TOTAL: 24 MMOL/L (ref 20–29)
COMP. METABOLIC PANEL (14): CHLORIDE: 102 MMOL/L (ref 96–106)
COMP. METABOLIC PANEL (14): CREATININE: 0.82 MG/DL (ref 0.57–1)
COMP. METABOLIC PANEL (14): EGFR IF AFRICN AM: 87 ML/MIN/1.73 (ref 59–?)
COMP. METABOLIC PANEL (14): EGFR IF NONAFRICN AM: 76 ML/MIN/1.73 (ref 59–?)
COMP. METABOLIC PANEL (14): GLOBULIN, TOTAL: 2.2 G/DL (ref 1.5–4.5)
COMP. METABOLIC PANEL (14): GLUCOSE: 92 MG/DL (ref 65–99)
COMP. METABOLIC PANEL (14): POTASSIUM: 4.7 MMOL/L (ref 3.5–5.2)
COMP. METABOLIC PANEL (14): PROTEIN, TOTAL: 7 G/DL (ref 6–8.5)
COMP. METABOLIC PANEL (14): SODIUM: 141 MMOL/L (ref 134–144)
FANA STAINING PATTERNS: CENTRIOLE PATTERN: (no result)
FANA STAINING PATTERNS: CENTROMERE PATTERN: (no result)
FANA STAINING PATTERNS: HOMOGENEOUS PATTERN: (no result)
FANA STAINING PATTERNS: MIDBODY PATTERN: (no result)
FANA STAINING PATTERNS: NOTE: (no result)
FANA STAINING PATTERNS: NUCLEAR DOT PATTERN: (no result)
FANA STAINING PATTERNS: NUCLEAR MEMBRANE PATTERN: (no result)
FANA STAINING PATTERNS: NUCLEOLAR PATTERN: (no result)
FANA STAINING PATTERNS: PCNA PATTERN: (no result)
FANA STAINING PATTERNS: SPECKLED PATTERN: (no result)
FANA STAINING PATTERNS: SPINDLE APPARATUS PATTERN: (no result)
HEPATITIS PANEL (4): HBSAG SCREEN: NEGATIVE
HEPATITIS PANEL (4): HEP A AB, IGM: NEGATIVE
HEPATITIS PANEL (4): HEP B CORE AB, IGM: NEGATIVE
HEPATITIS PANEL (4): HEP C VIRUS AB: 0.2 S/CO RATIO (ref 0–0.9)
MITOCHONDRIAL (M2) ANTIBODY: <20 UNITS
NASH FIBROSURE: ALPHA 2-MACROGLOBULINS, QN: 96 MG/DL — LOW (ref 110–276)
NASH FIBROSURE: ALT (SGPT) P5P: 81 IU/L — HIGH (ref 0–40)
NASH FIBROSURE: APOLIPOPROTEIN A-1: 173 MG/DL (ref 116–209)
NASH FIBROSURE: AST (SGOT) P5P: 52 IU/L — HIGH (ref 0–40)
NASH FIBROSURE: BILIRUBIN, TOTAL: 0.4 MG/DL (ref 0–1.2)
NASH FIBROSURE: CHOLESTEROL, TOTAL: 263 MG/DL — HIGH (ref 100–199)
NASH FIBROSURE: COMMENT: (no result)
NASH FIBROSURE: FIBROSIS SCORE: 0.08 (ref 0–0.21)
NASH FIBROSURE: FIBROSIS SCORING: (no result)
NASH FIBROSURE: FIBROSIS STAGE: (no result)
NASH FIBROSURE: GGT: 222 IU/L — HIGH (ref 0–60)
NASH FIBROSURE: GLUCOSE, SERUM: 88 MG/DL (ref 65–99)
NASH FIBROSURE: HAPTOGLOBIN: 222 MG/DL — HIGH (ref 34–200)
NASH FIBROSURE: HEIGHT: 64 IN
NASH FIBROSURE: INTERPRETATIONS: (no result)
NASH FIBROSURE: LIMITATIONS: (no result)
NASH FIBROSURE: NASH GRADE: (no result)
NASH FIBROSURE: NASH SCORE: 0.5 — HIGH (ref 0.25–?)
NASH FIBROSURE: NASH SCORING: (no result)
NASH FIBROSURE: STEATOSIS GRADE: (no result)
NASH FIBROSURE: STEATOSIS GRADING: (no result)
NASH FIBROSURE: STEATOSIS SCORE: 0.91 — HIGH (ref 0–0.3)
NASH FIBROSURE: TRIGLYCERIDES: 145 MG/DL (ref 0–149)
NASH FIBROSURE: WEIGHT: 178 LBS

## 2019-01-22 RX ORDER — DULOXETIN HYDROCHLORIDE 30 MG/1
30 CAPSULE, DELAYED RELEASE ORAL DAILY
Qty: 90 CAPSULE | Refills: 0 | Status: SHIPPED | OUTPATIENT
Start: 2019-01-22 | End: 2019-04-02

## 2019-02-20 ENCOUNTER — APPOINTMENT (OUTPATIENT)
Dept: OTHER | Facility: HOSPITAL | Age: 64
End: 2019-02-20

## 2019-02-20 ENCOUNTER — APPOINTMENT (OUTPATIENT)
Dept: ONCOLOGY | Facility: CLINIC | Age: 64
End: 2019-02-20

## 2019-03-18 ENCOUNTER — TRANSCRIBE ORDERS (OUTPATIENT)
Dept: LACTATION | Facility: HOSPITAL | Age: 64
End: 2019-03-18

## 2019-03-18 ENCOUNTER — TRANSCRIBE ORDERS (OUTPATIENT)
Dept: ADMINISTRATIVE | Facility: HOSPITAL | Age: 64
End: 2019-03-18

## 2019-03-18 DIAGNOSIS — Z12.39 SCREENING BREAST EXAMINATION: Primary | ICD-10-CM

## 2019-03-20 ENCOUNTER — HOSPITAL ENCOUNTER (OUTPATIENT)
Dept: MAMMOGRAPHY | Facility: HOSPITAL | Age: 64
Discharge: HOME | End: 2019-03-20
Admitting: INTERNAL MEDICINE

## 2019-03-20 DIAGNOSIS — Z12.39 SCREENING BREAST EXAMINATION: ICD-10-CM

## 2019-03-20 PROCEDURE — 77063 BREAST TOMOSYNTHESIS BI: CPT

## 2019-03-20 PROCEDURE — 77067 SCR MAMMO BI INCL CAD: CPT

## 2019-04-02 ENCOUNTER — APPOINTMENT (OUTPATIENT)
Dept: OTHER | Facility: HOSPITAL | Age: 64
End: 2019-04-02

## 2019-04-02 ENCOUNTER — OFFICE VISIT (OUTPATIENT)
Dept: ONCOLOGY | Facility: CLINIC | Age: 64
End: 2019-04-02

## 2019-04-02 VITALS
BODY MASS INDEX: 29.84 KG/M2 | DIASTOLIC BLOOD PRESSURE: 86 MMHG | WEIGHT: 174.8 LBS | TEMPERATURE: 98.9 F | SYSTOLIC BLOOD PRESSURE: 146 MMHG | OXYGEN SATURATION: 99 % | HEART RATE: 70 BPM | HEIGHT: 64 IN | RESPIRATION RATE: 14 BRPM

## 2019-04-02 DIAGNOSIS — Z85.3 HISTORY OF BREAST CANCER: Primary | ICD-10-CM

## 2019-04-02 LAB
BASOPHILS # BLD AUTO: 0.04 10*3/MM3 (ref 0–0.2)
BASOPHILS NFR BLD AUTO: 0.4 % (ref 0–1.5)
DEPRECATED RDW RBC AUTO: 40.2 FL (ref 37–54)
EOSINOPHIL # BLD AUTO: 0.13 10*3/MM3 (ref 0–0.4)
EOSINOPHIL NFR BLD AUTO: 1.4 % (ref 0.3–6.2)
ERYTHROCYTE [DISTWIDTH] IN BLOOD BY AUTOMATED COUNT: 12 % (ref 12.3–15.4)
HCT VFR BLD AUTO: 46.5 % (ref 34–46.6)
HGB BLD-MCNC: 15.8 G/DL (ref 12–15.9)
IMM GRANULOCYTES # BLD AUTO: 0.09 10*3/MM3 (ref 0–0.05)
IMM GRANULOCYTES NFR BLD AUTO: 0.9 % (ref 0–0.5)
LYMPHOCYTES # BLD AUTO: 2.38 10*3/MM3 (ref 0.7–3.1)
LYMPHOCYTES NFR BLD AUTO: 25.1 % (ref 19.6–45.3)
MCH RBC QN AUTO: 30.9 PG (ref 26.6–33)
MCHC RBC AUTO-ENTMCNC: 34 G/DL (ref 31.5–35.7)
MCV RBC AUTO: 91 FL (ref 79–97)
MONOCYTES # BLD AUTO: 0.67 10*3/MM3 (ref 0.1–0.9)
MONOCYTES NFR BLD AUTO: 7.1 % (ref 5–12)
NEUTROPHILS # BLD AUTO: 6.19 10*3/MM3 (ref 1.4–7)
NEUTROPHILS NFR BLD AUTO: 65.1 % (ref 42.7–76)
NRBC BLD AUTO-RTO: 0 /100 WBC (ref 0–0)
PLATELET # BLD AUTO: 226 10*3/MM3 (ref 140–450)
PMV BLD AUTO: 11.4 FL (ref 6–12)
RBC # BLD AUTO: 5.11 10*6/MM3 (ref 3.77–5.28)
WBC NRBC COR # BLD: 9.5 10*3/MM3 (ref 3.4–10.8)

## 2019-04-02 PROCEDURE — 85025 COMPLETE CBC W/AUTO DIFF WBC: CPT | Performed by: INTERNAL MEDICINE

## 2019-04-02 PROCEDURE — 99214 OFFICE O/P EST MOD 30 MIN: CPT | Performed by: INTERNAL MEDICINE

## 2019-04-02 PROCEDURE — 36415 COLL VENOUS BLD VENIPUNCTURE: CPT

## 2019-04-02 RX ORDER — PRAVASTATIN SODIUM 40 MG
40 TABLET ORAL DAILY
Refills: 12 | COMMUNITY
Start: 2019-03-27

## 2019-04-02 NOTE — PROGRESS NOTES
Subjective Improvement off tamoxifen ongoing vaginal dryness with plans to use topical estrogens.                                                Reason for Follow-Up :stage I breast cancer                           History of Present Illness      The patient is a 63-year-old female who presents for reevaluation of a history of early stage breast carcinoma.  She undergone studies in late January 2017 findings of a focal asymmetry in the upper outer quadrant of the right breast.  On February 07, 2017 diagnostic mammogram and ultrasound were performed and right breast core biopsy was obtained with invasive grade 2 mammary carcinoma (scirrhous type) focally with lobular pattern infiltration without associated in situ component, maximum extent was 12 mm ER +90%, WA at 90%, Ki-67 of 2% HER-2 negative.  The tumor morphology was consistent with breast carcinoma arising in a scar.  She had subsequent bilateral breast MRI showing irregular mass in the upper outer quadrant of the right breast measuring 15 x 11 x 18 mm, no other evidence of involvement present, no abnormalities on the left  March 31, 2017 she underwent a right breast lumpectomy with sentinel lymph node biopsy.  Final pathology included a 15 mm infiltrating moderately differentiated ductal adenocarcinoma without in situ component, margins negative, 2 of 2 lymph nodes negative for carcinoma-tumor stage [id number].  She was seen by medical oncology and further Oncotype DX testing returned with a low score of 13 with anastrozole initiated May 2017 and radiation therapy completed June 26, 2017.    The patient was advised per the use of Oncotype DX, Arimidex thereafter initiated May 2017.  Additional comorbidities included fibromyalgia, long-standing neuropathy, general debilitation as well as obesity.  Further testing included bone density of lumbar spine and bilateral hips performed August 17, 2017 with average values and no evidence of osteopenia or osteoporosis  "noted.  Additionally the patient underwent [id number] 02, 2018 without new abnormalities noted.    She presents wishing, potentially, to transfer care.    Patient now presents doing well except for hot flashes on Arimidex.  She is also having issues, continually, with vaginal dryness.  She has just started vaginal estrogens and hopes for better effect as she uses samples from her gynecologist.  She believes her hot flashes are reasonably manageable thus far.  The patient is next seen October 03, 2018 still having the same issues as previous and also described an elevated cholesterol levels.  As result we had a shannan discussion about her overall treatment with the possibility of switching from AI therapy to tamoxifen as a trial.  This may reduce several of her ongoing concerns.    This was discussed in detail October 3 and, as result, we went on to Initiate Tamoxifen.  The patient is next seen November 28 indicating, unfortunately, that she is not tolerated this treatment well with excessive hot flashes, interrupted sleep, change in mood, further evidence of hyperlipidemia and \"just feeling terrible\".  LFTs have been recently checked with mild elevation per transaminases and alkaline phosphatase and ultrasound of liver obtained November 8 demonstrates fatty infiltration of the liver.  As result we went on to discontinue tamoxifen at her November 28th visit.  She is next seen back April 2, 2019 and, fortunately, feels considerably better.  She has been seen by Dr. Horta who is considering using an Estring and/or topical estrogens for the patient's dyspareunia.  We have discussed that this would be reasonable to do.    Past Medical History:   Diagnosis Date   • Arthritis    • Breast cancer (CMS/HCC)    • Cancer (CMS/HCC) 2017    Right breast   • Fibromyalgia    • GERD (gastroesophageal reflux disease)    • H/O Colon polyp    • H/O Disorder of bile duct    • H/O Intractable migraine without aura "    • H/O MVP (mitral valve prolapse), mild    • H/O Nonalcoholic fatty liver disease    • H/O Obesity    • H/O Shingles 2013    Right labial   • H/O Tachycardia    • Hiatal hernia    • History of breast cancer 3/6/2018   • History of infectious mononucleosis    • Hyperlipidemia    • Hypertension    • Hypothyroidism    • Neuropathy    • PONV (postoperative nausea and vomiting)        Reproductive history:  , miscarriage at age 17, first menstrual period at age 11, postmenopausal age 43, positive hormone replacement therapy, prolonged course of vaginal estrogen  Past Surgical History:   Procedure Laterality Date   • APPENDECTOMY     • BACK SURGERY      Tumor   • BREAST LUMPECTOMY     • BREAST SURGERY Right     Cyst excision   • BREAST SURGERY Right     Aspiration   • BREAST SURGERY Right 2017    Lumpectomy   • CHOLECYSTECTOMY     • COLONOSCOPY       normal; 2015   • ENDOSCOPY     • WISDOM TOOTH EXTRACTION     • WRIST SURGERY          Current Outpatient Medications on File Prior to Visit   Medication Sig Dispense Refill   • baclofen (LIORESAL) 10 MG tablet TAKE 1 TABLET BY MOUTH THREE TIMES DAILY     • bisoprolol (ZEBeta) 10 MG tablet TAKE 1 TABLET BY MOUTH DAILY     • Coenzyme Q10 (CO Q 10) 10 MG capsule Take  by mouth.     • cyanocobalamin 1000 MCG/ML injection Inject 1,000 mcg under the skin.     • gabapentin (NEURONTIN) 300 MG capsule TAKE 1 CAPSULE BY MOUTH TWICE DAILY     • levothyroxine (SYNTHROID, LEVOTHROID) 88 MCG tablet TAKE 1 TABLET BY MOUTH EVERY DAY     • MAGNESIUM PO Take  by mouth.     • montelukast (SINGULAIR) 10 MG tablet TAKE 1 TABLET BY MOUTH EVERY NIGHT AT BEDTIME     • Multiple Vitamins-Minerals (WOMENS MULTIVITAMIN PO) Take  by mouth Daily.     • NON FORMULARY Omega 7     • Omega-3 Fatty Acids (OMEGA 3 PO) Take  by mouth Daily.     • Probiotic Product (PROBIOTIC DAILY PO) Take  by mouth.     • ramipril (ALTACE) 2.5 MG capsule TAKE 1 CAPSULE BY MOUTH EVERY DAY     •  pravastatin (PRAVACHOL) 40 MG tablet Take 40 mg by mouth Daily. Pt is waiting to start medication.  12   • [DISCONTINUED] DULoxetine (CYMBALTA) 30 MG capsule Take 1 capsule by mouth Daily. 90 capsule 0     No current facility-administered medications on file prior to visit.         ALLERGIES:    Allergies   Allergen Reactions   • Furosemide Hives and Swelling   • Sulfa Antibiotics Hives and Swelling   • Topiramate Hives and Swelling   • Adhesive Tape Rash     PAPER TAPE OKAY PER PT.   • Hydrochlorothiazide Rash   • Shellfish-Derived Products Rash        Social History     Socioeconomic History   • Marital status: Single     Spouse name: Not on file   • Number of children: 0   • Years of education: Associate Degree, 2 yrs   • Highest education level: Not on file   Occupational History   • Occupation: [employer]; ; Dental Assistant//      Employer: DISABLED   Tobacco Use   • Smoking status: Former Smoker     Packs/day: 1.00     Years: 10.00     Pack years: 10.00     Types: Cigarettes     Last attempt to quit: 3/23/1982     Years since quittin.0   • Smokeless tobacco: Never Used   Substance and Sexual Activity   • Alcohol use: No   • Drug use: No   • Sexual activity: Defer        Family History   Problem Relation Age of Onset   • Breast cancer Mother         In her 50s and 80s   • Diabetes Mother         Type 2   • Dementia Mother    • Diabetes Father         Type 2   • Heart disease Father    • Hypertension Father    • Colon cancer Maternal Aunt    • Thyroid disease Paternal Uncle    • Breast cancer Maternal Grandmother    • Cancer Paternal Grandmother    • Dementia Paternal Grandmother         Review of Systems   Constitutional: Negative for chills, fever, malaise/fatigue and weight loss.   HENT: Negative for congestion and sore throat.   Eyes: Negative for blurred vision and double vision.   Respiratory: Negative for cough, shortness of breath and wheezing.  "  Cardiovascular: Negative for chest pain, orthopnea and leg swelling.   Gastrointestinal: Negative for abdominal pain, nausea and vomiting.   Genitourinary: Negative for dysuria, frequency and urgency.   Musculoskeletal: Positive for back pain, myalgias and neck pain.   Skin: Erythematous,?  Heat rash under both breasts.                          Neurological: Negative for dizziness, sensory change, focal weakness and headaches.   Endo/Heme/Allergies: Does not bruise/bleed easily.   Psychiatric/Behavioral: Negative for depression. The patient is mildly nervous/anxious.     Objective     Vitals:    04/02/19 1559   BP: 146/86  Comment: pt has not had BP meds   Pulse: 70   Resp: 14   Temp: 98.9 °F (37.2 °C)   SpO2: 99%   Weight: 79.3 kg (174 lb 12.8 oz)   Height: 163.8 cm (64.49\")   PainSc: 0-No pain     Current Status 4/2/2019   ECOG score 0       Physical Exam    Constitutional: She is oriented to person, place, and time. She appears well-developed and well-nourished. No distress.   Animated  female in no distress   HENT:   Head: Normocephalic and atraumatic.   Eyes: Conjunctivae are normal. Pupils are equal, round, and reactive to light. No scleral icterus.   Neck: Normal range of motion. No tracheal deviation present.   Pulmonary/Chest: Effort normal.   Breasts: Bilateral breast exam with fibroglandular changes bilaterally, no dominant mass, status post previous lumpectomy well-healed right breast, bilateral axillary exams without abnormality                         musculoskeletal: Normal range of motion. She exhibits no edema or tenderness.   Lymphadenopathy:   She has no cervical adenopathy.   Neurological: She is alert and oriented to person, place, and time. No cranial nerve deficit.   Skin: Skin is warm and dry. No rash noted. No erythema    RECENT LABS:  Hematology WBC   Date Value Ref Range Status   04/02/2019 9.50 3.40 - 10.80 10*3/mm3 Final     RBC   Date Value Ref Range Status   04/02/2019 5.11 " 3.77 - 5.28 10*6/mm3 Final     Hemoglobin   Date Value Ref Range Status   04/02/2019 15.8 12.0 - 15.9 g/dL Final     Hematocrit   Date Value Ref Range Status   04/02/2019 46.5 34.0 - 46.6 % Final     Platelets   Date Value Ref Range Status   04/02/2019 226 140 - 450 10*3/mm3 Final          Assessment/Plan   ASSESSMENT:63-year-old female who presents with past medical history inclusive of GE reflux, apparent bile duct disorder, apparent ANTUNEZ, obesity, hyperlipidemia, hypertension, hypothyroidism, fibromyalgia and more recent diagnosis in 2017 -  · Stage I (T1CN 0) hormone receptor positive, HER-2/jerome negative right breast cancer. The patient was treated with breast conserving surgical procedure and subsequent radiotherapy.  Subsequent Oncotype DX testing turned a low risk score of 13 and she was treated with Arimidex initiated May 2017.  She presents for potential transfer care and we've discussed several issues including antifungal treatment for under breast rash bilaterally, continued use of Arimidex at this point but she is having apparent development of muscle skeletal discomfort that may well be related to the medications use and also estrogen deprivation symptoms in particular vaginal dryness . I believe that she's been treated properly with Arimidex at this point and have asked her to continue the medication and we may change to an alternative therapy if her muscle several pain worsens.  As to her vaginal dryness I have no objection to the use of vaginal estrogen therapy. As I understand thee issue data do not show an increased risk of cancer recurrence among women currently undergoing treatment for breast cancer or those with a personal history of breast cancer who use vaginal estrogen to relieve urogenital symptoms.  She is to see gynecology concerning this in the next 1-2 months and will need to undergo an examination initially.  After discussion approximately an hour we went on to continue her Arimidex  dosing and have her seen by by gynecology.  She is currently going to continue a trial of vaginal estrogens and encouraged her to obtain more samples as cost seems to be prohibitive.  She is tolerating Arimidex fairly well and will continue same with her last bone density obtained August 2017.      The patient seen back October 03, 2018.  She still is having vaginal dryness, hot flashes now hypercholesterolemia.  These issues are secondary to her AI therapy and her prognosis is such that she could do well with tamoxifen treatment.  We will have to adjust her Cymbalta that this would seem reasonable approach.  After much discussion we went on to discontinue Arimidex and moved to a trial of tamoxifen, trying to reduce Cymbalta and reassess the patient 8 weeks.  As she is reevaluated November 28 Tamoxifen is also not tolerated well and, considering the changes that we'll have to make to continue its use, we've begun to discuss discontinuing anti-hormonal therapy balancing her risk of recurrence versus quality of life.  After much discussion we plan to discontinue tamoxifen or at least the next 3 months as the patient's hyperlipidemia and ANTUNEZ are addressed.  The patient is next seen April 2, 2019 with plans to move to a statin trial as well as the use of an Estring and/or topical estrogens.  After much discussion it is felt reasonable to do so considering the patient's symptoms and quality of life.  Again dated does not indicate that recurrent breast cancer is a significant risk under the circumstances.    Plan:  *Patient will remain off of Tamoxifen  *Additional exam today bilateral breast without evidence of recurrent disease, recent mammogram also negative  *1 year follow-up  *Patient to pursue topical estrogen and/or Estring therapy for dyspareunia.

## 2019-06-19 ENCOUNTER — OFFICE (OUTPATIENT)
Dept: URBAN - METROPOLITAN AREA CLINIC 66 | Facility: CLINIC | Age: 64
End: 2019-06-19

## 2019-06-19 VITALS
WEIGHT: 173 LBS | HEART RATE: 76 BPM | DIASTOLIC BLOOD PRESSURE: 78 MMHG | HEIGHT: 64 IN | SYSTOLIC BLOOD PRESSURE: 130 MMHG

## 2019-06-19 DIAGNOSIS — R10.9 UNSPECIFIED ABDOMINAL PAIN: ICD-10-CM

## 2019-06-19 DIAGNOSIS — R10.11 RIGHT UPPER QUADRANT PAIN: ICD-10-CM

## 2019-06-19 DIAGNOSIS — K75.81 NONALCOHOLIC STEATOHEPATITIS (NASH): ICD-10-CM

## 2019-06-19 PROCEDURE — 99213 OFFICE O/P EST LOW 20 MIN: CPT | Performed by: INTERNAL MEDICINE

## 2019-10-18 ENCOUNTER — TRANSCRIBE ORDERS (OUTPATIENT)
Dept: ADMINISTRATIVE | Facility: HOSPITAL | Age: 64
End: 2019-10-18

## 2019-10-18 DIAGNOSIS — K76.0 FATTY LIVER: ICD-10-CM

## 2019-10-18 DIAGNOSIS — R10.11 RUQ PAIN: Primary | ICD-10-CM

## 2019-10-21 ENCOUNTER — HOSPITAL ENCOUNTER (OUTPATIENT)
Dept: ULTRASOUND IMAGING | Facility: HOSPITAL | Age: 64
Discharge: HOME | End: 2019-10-21
Admitting: INTERNAL MEDICINE

## 2019-10-21 DIAGNOSIS — R10.11 RUQ PAIN: ICD-10-CM

## 2019-10-21 DIAGNOSIS — K76.0 FATTY LIVER: ICD-10-CM

## 2019-10-21 PROCEDURE — 76705 ECHO EXAM OF ABDOMEN: CPT

## 2020-01-07 ENCOUNTER — OFFICE (OUTPATIENT)
Dept: URBAN - METROPOLITAN AREA CLINIC 66 | Facility: CLINIC | Age: 65
End: 2020-01-07

## 2020-01-07 VITALS
WEIGHT: 156 LBS | SYSTOLIC BLOOD PRESSURE: 136 MMHG | HEIGHT: 64 IN | HEART RATE: 124 BPM | DIASTOLIC BLOOD PRESSURE: 82 MMHG

## 2020-01-07 DIAGNOSIS — K75.81 NONALCOHOLIC STEATOHEPATITIS (NASH): ICD-10-CM

## 2020-01-07 DIAGNOSIS — R10.11 RIGHT UPPER QUADRANT PAIN: ICD-10-CM

## 2020-01-07 DIAGNOSIS — R10.31 RIGHT LOWER QUADRANT PAIN: ICD-10-CM

## 2020-01-07 DIAGNOSIS — R11.0 NAUSEA: ICD-10-CM

## 2020-01-07 DIAGNOSIS — D36.10 BENIGN NEOPLASM OF PERIPHERAL NERVES AND AUTONOMIC NERVOUS S: ICD-10-CM

## 2020-01-07 PROCEDURE — 99214 OFFICE O/P EST MOD 30 MIN: CPT | Performed by: INTERNAL MEDICINE

## 2020-01-07 RX ORDER — DICYCLOMINE HYDROCHLORIDE 20 MG/1
80 TABLET ORAL
Qty: 120 | Refills: 12 | Status: ACTIVE
Start: 2020-01-07

## 2020-07-22 ENCOUNTER — TRANSCRIBE ORDERS (OUTPATIENT)
Dept: ADMINISTRATIVE | Facility: HOSPITAL | Age: 65
End: 2020-07-22

## 2020-07-22 DIAGNOSIS — Z12.31 SCREENING MAMMOGRAM FOR HIGH-RISK PATIENT: Primary | ICD-10-CM

## 2020-07-27 ENCOUNTER — HOSPITAL ENCOUNTER (OUTPATIENT)
Dept: MAMMOGRAPHY | Facility: HOSPITAL | Age: 65
Discharge: HOME | End: 2020-07-27
Admitting: INTERNAL MEDICINE

## 2020-07-27 DIAGNOSIS — Z12.31 SCREENING MAMMOGRAM FOR HIGH-RISK PATIENT: ICD-10-CM

## 2020-07-27 PROCEDURE — 77067 SCR MAMMO BI INCL CAD: CPT

## 2020-07-27 PROCEDURE — 77063 BREAST TOMOSYNTHESIS BI: CPT

## 2020-10-22 ENCOUNTER — TRANSCRIBE ORDERS (OUTPATIENT)
Dept: ADMINISTRATIVE | Facility: HOSPITAL | Age: 65
End: 2020-10-22

## 2020-10-22 DIAGNOSIS — I05.9 RHEUMATIC DISEASE OF MITRAL VALVE: Primary | ICD-10-CM

## 2020-11-09 ENCOUNTER — HOSPITAL ENCOUNTER (OUTPATIENT)
Dept: CARDIOLOGY | Facility: HOSPITAL | Age: 65
Discharge: HOME | End: 2020-11-09
Admitting: INTERNAL MEDICINE

## 2020-11-09 VITALS
BODY MASS INDEX: 29.71 KG/M2 | WEIGHT: 174 LBS | HEIGHT: 64 IN | SYSTOLIC BLOOD PRESSURE: 144 MMHG | HEART RATE: 95 BPM | DIASTOLIC BLOOD PRESSURE: 80 MMHG

## 2020-11-09 DIAGNOSIS — I05.9 RHEUMATIC DISEASE OF MITRAL VALVE: ICD-10-CM

## 2020-11-09 PROCEDURE — 93306 TTE W/DOPPLER COMPLETE: CPT | Performed by: INTERNAL MEDICINE

## 2020-11-09 PROCEDURE — 93306 TTE W/DOPPLER COMPLETE: CPT

## 2020-11-10 LAB
AORTIC ARCH: 2.1 CM
ASCENDING AORTA: 2.4 CM
BH CV ECHO MEAS - ACS: 1.6 CM
BH CV ECHO MEAS - AO MAX PG (FULL): 5.8 MMHG
BH CV ECHO MEAS - AO MAX PG: 11.3 MMHG
BH CV ECHO MEAS - AO MEAN PG (FULL): 3.3 MMHG
BH CV ECHO MEAS - AO MEAN PG: 6.5 MMHG
BH CV ECHO MEAS - AO ROOT AREA (BSA CORRECTED): 1.3
BH CV ECHO MEAS - AO ROOT AREA: 4.7 CM^2
BH CV ECHO MEAS - AO ROOT DIAM: 2.4 CM
BH CV ECHO MEAS - AO V2 MAX: 168.3 CM/SEC
BH CV ECHO MEAS - AO V2 MEAN: 119.9 CM/SEC
BH CV ECHO MEAS - AO V2 VTI: 32 CM
BH CV ECHO MEAS - ASC AORTA: 2.4 CM
BH CV ECHO MEAS - AVA(I,A): 2 CM^2
BH CV ECHO MEAS - AVA(I,D): 2 CM^2
BH CV ECHO MEAS - AVA(V,A): 1.7 CM^2
BH CV ECHO MEAS - AVA(V,D): 1.7 CM^2
BH CV ECHO MEAS - BSA(HAYCOCK): 1.9 M^2
BH CV ECHO MEAS - BSA: 1.8 M^2
BH CV ECHO MEAS - BZI_BMI: 29.9 KILOGRAMS/M^2
BH CV ECHO MEAS - BZI_METRIC_HEIGHT: 162.6 CM
BH CV ECHO MEAS - BZI_METRIC_WEIGHT: 78.9 KG
BH CV ECHO MEAS - EDV(MOD-SP2): 63 ML
BH CV ECHO MEAS - EDV(MOD-SP4): 53 ML
BH CV ECHO MEAS - EDV(TEICH): 77.3 ML
BH CV ECHO MEAS - EF(CUBED): 80.6 %
BH CV ECHO MEAS - EF(MOD-BP): 64 %
BH CV ECHO MEAS - EF(MOD-SP2): 63.5 %
BH CV ECHO MEAS - EF(MOD-SP4): 62.3 %
BH CV ECHO MEAS - EF(TEICH): 73.6 %
BH CV ECHO MEAS - ESV(MOD-SP2): 23 ML
BH CV ECHO MEAS - ESV(MOD-SP4): 20 ML
BH CV ECHO MEAS - ESV(TEICH): 20.4 ML
BH CV ECHO MEAS - FS: 42.2 %
BH CV ECHO MEAS - IVS/LVPW: 0.97
BH CV ECHO MEAS - IVSD: 0.85 CM
BH CV ECHO MEAS - LAT PEAK E' VEL: 10.2 CM/SEC
BH CV ECHO MEAS - LV DIASTOLIC VOL/BSA (35-75): 28.7 ML/M^2
BH CV ECHO MEAS - LV MASS(C)D: 111.8 GRAMS
BH CV ECHO MEAS - LV MASS(C)DI: 60.6 GRAMS/M^2
BH CV ECHO MEAS - LV MAX PG: 5.5 MMHG
BH CV ECHO MEAS - LV MEAN PG: 3.2 MMHG
BH CV ECHO MEAS - LV SYSTOLIC VOL/BSA (12-30): 10.8 ML/M^2
BH CV ECHO MEAS - LV V1 MAX: 117.5 CM/SEC
BH CV ECHO MEAS - LV V1 MEAN: 85.3 CM/SEC
BH CV ECHO MEAS - LV V1 VTI: 25.4 CM
BH CV ECHO MEAS - LVIDD: 4.2 CM
BH CV ECHO MEAS - LVIDS: 2.4 CM
BH CV ECHO MEAS - LVLD AP2: 7.5 CM
BH CV ECHO MEAS - LVLD AP4: 7.1 CM
BH CV ECHO MEAS - LVLS AP2: 6.1 CM
BH CV ECHO MEAS - LVLS AP4: 6 CM
BH CV ECHO MEAS - LVOT AREA (M): 2.5 CM^2
BH CV ECHO MEAS - LVOT AREA: 2.5 CM^2
BH CV ECHO MEAS - LVOT DIAM: 1.8 CM
BH CV ECHO MEAS - LVPWD: 0.88 CM
BH CV ECHO MEAS - MED PEAK E' VEL: 9.8 CM/SEC
BH CV ECHO MEAS - MR MAX PG: 17 MMHG
BH CV ECHO MEAS - MR MAX VEL: 206.3 CM/SEC
BH CV ECHO MEAS - MV A MAX VEL: 112 CM/SEC
BH CV ECHO MEAS - MV DEC SLOPE: 328.7 CM/SEC^2
BH CV ECHO MEAS - MV DEC TIME: 0.21 SEC
BH CV ECHO MEAS - MV E MAX VEL: 89.4 CM/SEC
BH CV ECHO MEAS - MV E/A: 0.8
BH CV ECHO MEAS - MV MAX PG: 6.4 MMHG
BH CV ECHO MEAS - MV MEAN PG: 2.8 MMHG
BH CV ECHO MEAS - MV P1/2T MAX VEL: 58.6 CM/SEC
BH CV ECHO MEAS - MV P1/2T: 52.2 MSEC
BH CV ECHO MEAS - MV V2 MAX: 126.2 CM/SEC
BH CV ECHO MEAS - MV V2 MEAN: 77.9 CM/SEC
BH CV ECHO MEAS - MV V2 VTI: 28.2 CM
BH CV ECHO MEAS - MVA P1/2T LCG: 3.8 CM^2
BH CV ECHO MEAS - MVA(P1/2T): 4.2 CM^2
BH CV ECHO MEAS - MVA(VTI): 2.3 CM^2
BH CV ECHO MEAS - PA ACC TIME: 0.13 SEC
BH CV ECHO MEAS - PA MAX PG (FULL): 3.5 MMHG
BH CV ECHO MEAS - PA MAX PG: 5.3 MMHG
BH CV ECHO MEAS - PA PR(ACCEL): 19.6 MMHG
BH CV ECHO MEAS - PA V2 MAX: 115.4 CM/SEC
BH CV ECHO MEAS - PULM A REVS DUR: 0.12 SEC
BH CV ECHO MEAS - PULM A REVS VEL: 38.8 CM/SEC
BH CV ECHO MEAS - PULM DIAS VEL: 65.7 CM/SEC
BH CV ECHO MEAS - PULM S/D: 1.3
BH CV ECHO MEAS - PULM SYS VEL: 85.1 CM/SEC
BH CV ECHO MEAS - RAP SYSTOLE: 3 MMHG
BH CV ECHO MEAS - RV MAX PG: 1.8 MMHG
BH CV ECHO MEAS - RV MEAN PG: 0.84 MMHG
BH CV ECHO MEAS - RV V1 MAX: 66.8 CM/SEC
BH CV ECHO MEAS - RV V1 MEAN: 42.3 CM/SEC
BH CV ECHO MEAS - RV V1 VTI: 13.6 CM
BH CV ECHO MEAS - SI(AO): 81.1 ML/M^2
BH CV ECHO MEAS - SI(CUBED): 31.8 ML/M^2
BH CV ECHO MEAS - SI(LVOT): 34.4 ML/M^2
BH CV ECHO MEAS - SI(MOD-SP2): 21.7 ML/M^2
BH CV ECHO MEAS - SI(MOD-SP4): 17.9 ML/M^2
BH CV ECHO MEAS - SI(TEICH): 30.9 ML/M^2
BH CV ECHO MEAS - SUP REN AO DIAM: 1.6 CM
BH CV ECHO MEAS - SV(AO): 149.5 ML
BH CV ECHO MEAS - SV(CUBED): 58.5 ML
BH CV ECHO MEAS - SV(LVOT): 63.4 ML
BH CV ECHO MEAS - SV(MOD-SP2): 40 ML
BH CV ECHO MEAS - SV(MOD-SP4): 33 ML
BH CV ECHO MEAS - SV(TEICH): 56.9 ML
BH CV ECHO MEAS - TAPSE (>1.6): 2.7 CM
BH CV ECHO MEASUREMENTS AVERAGE E/E' RATIO: 8.94
BH CV XLRA - RV BASE: 2.9 CM
BH CV XLRA - RV LENGTH: 5.5 CM
BH CV XLRA - RV MID: 1.4 CM
BH CV XLRA - TDI S': 14 CM/SEC
LEFT ATRIUM VOLUME INDEX: 24 ML/M2
MAXIMAL PREDICTED HEART RATE: 154 BPM
SINUS: 2.4 CM
STJ: 2.1 CM
STRESS TARGET HR: 131 BPM

## 2021-01-12 ENCOUNTER — OFFICE (OUTPATIENT)
Dept: URBAN - METROPOLITAN AREA CLINIC 66 | Facility: CLINIC | Age: 66
End: 2021-01-12

## 2021-01-12 VITALS
HEART RATE: 87 BPM | HEIGHT: 64 IN | SYSTOLIC BLOOD PRESSURE: 148 MMHG | DIASTOLIC BLOOD PRESSURE: 79 MMHG | TEMPERATURE: 98 F | WEIGHT: 153 LBS

## 2021-01-12 DIAGNOSIS — K76.0 FATTY (CHANGE OF) LIVER, NOT ELSEWHERE CLASSIFIED: ICD-10-CM

## 2021-01-12 DIAGNOSIS — Z86.010 PERSONAL HISTORY OF COLONIC POLYPS: ICD-10-CM

## 2021-01-12 PROCEDURE — 99213 OFFICE O/P EST LOW 20 MIN: CPT | Performed by: INTERNAL MEDICINE

## 2021-03-16 ENCOUNTER — BULK ORDERING (OUTPATIENT)
Dept: CASE MANAGEMENT | Facility: OTHER | Age: 66
End: 2021-03-16

## 2021-03-16 DIAGNOSIS — Z23 IMMUNIZATION DUE: ICD-10-CM

## 2024-04-16 ENCOUNTER — APPOINTMENT (OUTPATIENT)
Dept: CARDIOLOGY | Facility: HOSPITAL | Age: 69
End: 2024-04-16
Payer: MEDICARE

## 2024-04-16 ENCOUNTER — HOSPITAL ENCOUNTER (EMERGENCY)
Facility: HOSPITAL | Age: 69
Discharge: HOME | End: 2024-04-16
Attending: STUDENT IN AN ORGANIZED HEALTH CARE EDUCATION/TRAINING PROGRAM
Payer: MEDICARE

## 2024-04-16 ENCOUNTER — APPOINTMENT (OUTPATIENT)
Dept: GENERAL RADIOLOGY | Facility: HOSPITAL | Age: 69
End: 2024-04-16
Payer: MEDICARE

## 2024-04-16 VITALS
OXYGEN SATURATION: 100 % | TEMPERATURE: 97.3 F | DIASTOLIC BLOOD PRESSURE: 74 MMHG | HEART RATE: 85 BPM | RESPIRATION RATE: 18 BRPM | SYSTOLIC BLOOD PRESSURE: 140 MMHG

## 2024-04-16 DIAGNOSIS — R79.89 ELEVATED LFTS: ICD-10-CM

## 2024-04-16 DIAGNOSIS — R00.2 PALPITATIONS: ICD-10-CM

## 2024-04-16 DIAGNOSIS — R60.0 BILATERAL LOWER EXTREMITY EDEMA: Primary | ICD-10-CM

## 2024-04-16 LAB
ALBUMIN SERPL-MCNC: 4.9 G/DL (ref 3.5–5.2)
ALBUMIN/GLOB SERPL: 2.2 G/DL
ALP SERPL-CCNC: 168 U/L (ref 39–117)
ALT SERPL W P-5'-P-CCNC: 60 U/L (ref 1–33)
ANION GAP SERPL CALCULATED.3IONS-SCNC: 12 MMOL/L (ref 5–15)
AST SERPL-CCNC: 44 U/L (ref 1–32)
BASOPHILS # BLD AUTO: 0.03 10*3/MM3 (ref 0–0.2)
BASOPHILS NFR BLD AUTO: 0.4 % (ref 0–1.5)
BH CV LOW VAS LEFT LESSER SAPH VESSEL: 1
BH CV LOWER VASCULAR LEFT COMMON FEMORAL AUGMENT: NORMAL
BH CV LOWER VASCULAR LEFT COMMON FEMORAL COMPETENT: NORMAL
BH CV LOWER VASCULAR LEFT COMMON FEMORAL COMPRESS: NORMAL
BH CV LOWER VASCULAR LEFT COMMON FEMORAL PHASIC: NORMAL
BH CV LOWER VASCULAR LEFT COMMON FEMORAL SPONT: NORMAL
BH CV LOWER VASCULAR LEFT DISTAL FEMORAL COMPRESS: NORMAL
BH CV LOWER VASCULAR LEFT GASTRONEMIUS COMPRESS: NORMAL
BH CV LOWER VASCULAR LEFT GREATER SAPH AK COMPRESS: NORMAL
BH CV LOWER VASCULAR LEFT GREATER SAPH BK COMPRESS: NORMAL
BH CV LOWER VASCULAR LEFT LESSER SAPH COMPRESS: NORMAL
BH CV LOWER VASCULAR LEFT LESSER SAPH THROMBUS: NORMAL
BH CV LOWER VASCULAR LEFT MID FEMORAL AUGMENT: NORMAL
BH CV LOWER VASCULAR LEFT MID FEMORAL COMPETENT: NORMAL
BH CV LOWER VASCULAR LEFT MID FEMORAL COMPRESS: NORMAL
BH CV LOWER VASCULAR LEFT MID FEMORAL PHASIC: NORMAL
BH CV LOWER VASCULAR LEFT MID FEMORAL SPONT: NORMAL
BH CV LOWER VASCULAR LEFT PERONEAL COMPRESS: NORMAL
BH CV LOWER VASCULAR LEFT POPLITEAL AUGMENT: NORMAL
BH CV LOWER VASCULAR LEFT POPLITEAL COMPETENT: NORMAL
BH CV LOWER VASCULAR LEFT POPLITEAL COMPRESS: NORMAL
BH CV LOWER VASCULAR LEFT POPLITEAL PHASIC: NORMAL
BH CV LOWER VASCULAR LEFT POPLITEAL SPONT: NORMAL
BH CV LOWER VASCULAR LEFT POSTERIOR TIBIAL COMPRESS: NORMAL
BH CV LOWER VASCULAR LEFT PROFUNDA FEMORAL COMPRESS: NORMAL
BH CV LOWER VASCULAR LEFT PROXIMAL FEMORAL COMPRESS: NORMAL
BH CV LOWER VASCULAR LEFT SAPHENOFEMORAL JUNCTION COMPRESS: NORMAL
BH CV LOWER VASCULAR RIGHT COMMON FEMORAL AUGMENT: NORMAL
BH CV LOWER VASCULAR RIGHT COMMON FEMORAL COMPETENT: NORMAL
BH CV LOWER VASCULAR RIGHT COMMON FEMORAL COMPRESS: NORMAL
BH CV LOWER VASCULAR RIGHT COMMON FEMORAL PHASIC: NORMAL
BH CV LOWER VASCULAR RIGHT COMMON FEMORAL SPONT: NORMAL
BH CV LOWER VASCULAR RIGHT DISTAL FEMORAL COMPRESS: NORMAL
BH CV LOWER VASCULAR RIGHT GASTRONEMIUS COMPRESS: NORMAL
BH CV LOWER VASCULAR RIGHT GREATER SAPH AK COMPRESS: NORMAL
BH CV LOWER VASCULAR RIGHT GREATER SAPH BK COMPRESS: NORMAL
BH CV LOWER VASCULAR RIGHT LESSER SAPH COMPRESS: NORMAL
BH CV LOWER VASCULAR RIGHT MID FEMORAL AUGMENT: NORMAL
BH CV LOWER VASCULAR RIGHT MID FEMORAL COMPETENT: NORMAL
BH CV LOWER VASCULAR RIGHT MID FEMORAL COMPRESS: NORMAL
BH CV LOWER VASCULAR RIGHT MID FEMORAL PHASIC: NORMAL
BH CV LOWER VASCULAR RIGHT MID FEMORAL SPONT: NORMAL
BH CV LOWER VASCULAR RIGHT PERONEAL COMPRESS: NORMAL
BH CV LOWER VASCULAR RIGHT POPLITEAL AUGMENT: NORMAL
BH CV LOWER VASCULAR RIGHT POPLITEAL COMPETENT: NORMAL
BH CV LOWER VASCULAR RIGHT POPLITEAL COMPRESS: NORMAL
BH CV LOWER VASCULAR RIGHT POPLITEAL PHASIC: NORMAL
BH CV LOWER VASCULAR RIGHT POPLITEAL SPONT: NORMAL
BH CV LOWER VASCULAR RIGHT POSTERIOR TIBIAL COMPRESS: NORMAL
BH CV LOWER VASCULAR RIGHT PROFUNDA FEMORAL COMPRESS: NORMAL
BH CV LOWER VASCULAR RIGHT PROXIMAL FEMORAL COMPRESS: NORMAL
BH CV LOWER VASCULAR RIGHT SAPHENOFEMORAL JUNCTION COMPRESS: NORMAL
BH CV VAS PRELIMINARY FINDINGS SCRIPTING: 1
BILIRUB SERPL-MCNC: 0.3 MG/DL (ref 0–1.2)
BUN SERPL-MCNC: 11 MG/DL (ref 8–23)
BUN/CREAT SERPL: 16.7 (ref 7–25)
CALCIUM SPEC-SCNC: 9.5 MG/DL (ref 8.6–10.5)
CHLORIDE SERPL-SCNC: 104 MMOL/L (ref 98–107)
CO2 SERPL-SCNC: 26 MMOL/L (ref 22–29)
CREAT SERPL-MCNC: 0.66 MG/DL (ref 0.57–1)
DEPRECATED RDW RBC AUTO: 39.8 FL (ref 37–54)
EGFRCR SERPLBLD CKD-EPI 2021: 95.1 ML/MIN/1.73
EOSINOPHIL # BLD AUTO: 0.11 10*3/MM3 (ref 0–0.4)
EOSINOPHIL NFR BLD AUTO: 1.6 % (ref 0.3–6.2)
ERYTHROCYTE [DISTWIDTH] IN BLOOD BY AUTOMATED COUNT: 12 % (ref 12.3–15.4)
GLOBULIN UR ELPH-MCNC: 2.2 GM/DL
GLUCOSE SERPL-MCNC: 67 MG/DL (ref 65–99)
HCT VFR BLD AUTO: 46.8 % (ref 34–46.6)
HGB BLD-MCNC: 15.1 G/DL (ref 12–15.9)
IMM GRANULOCYTES # BLD AUTO: 0.03 10*3/MM3 (ref 0–0.05)
IMM GRANULOCYTES NFR BLD AUTO: 0.4 % (ref 0–0.5)
LYMPHOCYTES # BLD AUTO: 1.5 10*3/MM3 (ref 0.7–3.1)
LYMPHOCYTES NFR BLD AUTO: 21.5 % (ref 19.6–45.3)
MCH RBC QN AUTO: 29.3 PG (ref 26.6–33)
MCHC RBC AUTO-ENTMCNC: 32.3 G/DL (ref 31.5–35.7)
MCV RBC AUTO: 90.7 FL (ref 79–97)
MONOCYTES # BLD AUTO: 0.63 10*3/MM3 (ref 0.1–0.9)
MONOCYTES NFR BLD AUTO: 9 % (ref 5–12)
NEUTROPHILS NFR BLD AUTO: 4.68 10*3/MM3 (ref 1.7–7)
NEUTROPHILS NFR BLD AUTO: 67.1 % (ref 42.7–76)
NRBC BLD AUTO-RTO: 0 /100 WBC (ref 0–0.2)
PLATELET # BLD AUTO: 227 10*3/MM3 (ref 140–450)
PMV BLD AUTO: 10.7 FL (ref 6–12)
POTASSIUM SERPL-SCNC: 3.4 MMOL/L (ref 3.5–5.2)
PROT SERPL-MCNC: 7.1 G/DL (ref 6–8.5)
RBC # BLD AUTO: 5.16 10*6/MM3 (ref 3.77–5.28)
SODIUM SERPL-SCNC: 142 MMOL/L (ref 136–145)
WBC NRBC COR # BLD AUTO: 6.98 10*3/MM3 (ref 3.4–10.8)

## 2024-04-16 PROCEDURE — 99284 EMERGENCY DEPT VISIT MOD MDM: CPT

## 2024-04-16 PROCEDURE — 93225 XTRNL ECG REC<48 HRS REC: CPT

## 2024-04-16 PROCEDURE — 93970 EXTREMITY STUDY: CPT

## 2024-04-16 PROCEDURE — 80053 COMPREHEN METABOLIC PANEL: CPT | Performed by: PHYSICIAN ASSISTANT

## 2024-04-16 PROCEDURE — 93010 ELECTROCARDIOGRAM REPORT: CPT | Performed by: INTERNAL MEDICINE

## 2024-04-16 PROCEDURE — 36415 COLL VENOUS BLD VENIPUNCTURE: CPT

## 2024-04-16 PROCEDURE — 93005 ELECTROCARDIOGRAM TRACING: CPT | Performed by: PHYSICIAN ASSISTANT

## 2024-04-16 PROCEDURE — 93226 XTRNL ECG REC<48 HR SCAN A/R: CPT

## 2024-04-16 PROCEDURE — 71045 X-RAY EXAM CHEST 1 VIEW: CPT

## 2024-04-16 PROCEDURE — 85025 COMPLETE CBC W/AUTO DIFF WBC: CPT | Performed by: PHYSICIAN ASSISTANT

## 2024-04-16 PROCEDURE — 93970 EXTREMITY STUDY: CPT | Performed by: SURGERY

## 2024-04-16 NOTE — DISCHARGE INSTRUCTIONS
Stay well-hydrated  Try low-salt diet of 3000 mg or less  Elevate legs when needed, you can also use compression stockings to prevent or treat swelling    Recheck with PCP and cardiology

## 2024-04-16 NOTE — ED PROVIDER NOTES
EMERGENCY DEPARTMENT ENCOUNTER  Room Number:  02/02  PCP: Sandra Horta MD  Independent Historians: Patient      HPI:  Chief Complaint: had concerns including Leg Swelling.       A complete HPI/ROS/PMH/PSH/SH/FH are unobtainable due to: None    Chronic or social conditions impacting patient care (Social Determinants of Health): None      Context: Lindsay Meier is a 68 y.o. female with a medical history of fibromyalgia, GERD, breast cancer, arthritis who presents to the ED c/o acute bilateral lower extremity edema.  She states she has had intermittent lower extremity edema for years, happens pretty rarely.  He has been more frequent the last couple months and for the last several days it has been the most swollen she has seen it, worse in the left leg.  Does report some tight burning sensation in both lower extremities.  Denies any chest pain or shortness of breath.  No history of DVT or PE.  Not anticoagulated.  No recent travel or immobilization.  She does have a history of breast cancer for which she completed treatment in 2017.      Review of prior external notes (non-ED) -and- Review of prior external test results outside of this encounter:  Office visit with oncology in April 2019 for history of breast cancer.  Was seen for her 1 year follow-up, remained without evidence of recurrent disease.        PAST MEDICAL HISTORY  Active Ambulatory Problems     Diagnosis Date Noted    History of breast cancer 03/06/2018     Resolved Ambulatory Problems     Diagnosis Date Noted    No Resolved Ambulatory Problems     Past Medical History:   Diagnosis Date    Arthritis     Breast cancer     Cancer 2017    Fibromyalgia     GERD (gastroesophageal reflux disease)     H/O Colon polyp     H/O Disorder of bile duct     H/O Intractable migraine without aura 2014    H/O MVP (mitral valve prolapse), mild     H/O Nonalcoholic fatty liver disease     H/O Obesity     H/O Shingles 07/2013    H/O Tachycardia     Hiatal hernia      History of infectious mononucleosis     Hyperlipidemia     Hypertension     Hypothyroidism     Neuropathy     PONV (postoperative nausea and vomiting)          PAST SURGICAL HISTORY  Past Surgical History:   Procedure Laterality Date    APPENDECTOMY      BACK SURGERY      Tumor    BREAST LUMPECTOMY      BREAST SURGERY Right     Cyst excision    BREAST SURGERY Right     Aspiration    BREAST SURGERY Right 2017    Lumpectomy    CHOLECYSTECTOMY      COLONOSCOPY       normal; 2015    ENDOSCOPY      WISDOM TOOTH EXTRACTION      WRIST SURGERY           FAMILY HISTORY  Family History   Problem Relation Age of Onset    Breast cancer Mother         In her 50s and 80s    Diabetes Mother         Type 2    Dementia Mother     Diabetes Father         Type 2    Heart disease Father     Hypertension Father     Colon cancer Maternal Aunt     Thyroid disease Paternal Uncle     Breast cancer Maternal Grandmother     Cancer Paternal Grandmother     Dementia Paternal Grandmother          SOCIAL HISTORY  Social History     Socioeconomic History    Marital status: Single    Number of children: 0    Years of education: Associate Degree, 2 yrs   Tobacco Use    Smoking status: Former     Current packs/day: 0.00     Average packs/day: 1 pack/day for 10.0 years (10.0 ttl pk-yrs)     Types: Cigarettes     Start date: 3/23/1972     Quit date: 3/23/1982     Years since quittin.0    Smokeless tobacco: Never   Substance and Sexual Activity    Alcohol use: No    Drug use: No    Sexual activity: Defer         ALLERGIES  Furosemide, Sulfa antibiotics, Topiramate, Adhesive tape, Hydrochlorothiazide, and Shellfish-derived products      REVIEW OF SYSTEMS  Review of Systems  Included in HPI  All systems reviewed and negative except for those discussed in HPI.      PHYSICAL EXAM    I have reviewed the triage vital signs and nursing notes.    ED Triage Vitals   Temp Heart Rate Resp BP SpO2   24 1358 24 1358 24 1358 24  1405 04/16/24 1358   97.3 °F (36.3 °C) (!) 124 18 169/84 96 %      Temp src Heart Rate Source Patient Position BP Location FiO2 (%)   -- -- -- -- --              Physical Exam  GENERAL: alert, no acute distress  SKIN: Warm, dry  HENT: Normocephalic, atraumatic  EYES: no scleral icterus  CV: regular rhythm, regular rate, nonpitting right lower extremity edema, 1+ nonpitting to the left lower extremity, no erythema  RESPIRATORY: normal effort, lungs clear  ABDOMEN: nondistended  MUSCULOSKELETAL: no deformity  NEURO: alert, moves all extremities, follows commands            LAB RESULTS  Recent Results (from the past 24 hour(s))   Comprehensive Metabolic Panel    Collection Time: 04/16/24  3:30 PM    Specimen: Arm, Left; Blood   Result Value Ref Range    Glucose 67 65 - 99 mg/dL    BUN 11 8 - 23 mg/dL    Creatinine 0.66 0.57 - 1.00 mg/dL    Sodium 142 136 - 145 mmol/L    Potassium 3.4 (L) 3.5 - 5.2 mmol/L    Chloride 104 98 - 107 mmol/L    CO2 26.0 22.0 - 29.0 mmol/L    Calcium 9.5 8.6 - 10.5 mg/dL    Total Protein 7.1 6.0 - 8.5 g/dL    Albumin 4.9 3.5 - 5.2 g/dL    ALT (SGPT) 60 (H) 1 - 33 U/L    AST (SGOT) 44 (H) 1 - 32 U/L    Alkaline Phosphatase 168 (H) 39 - 117 U/L    Total Bilirubin 0.3 0.0 - 1.2 mg/dL    Globulin 2.2 gm/dL    A/G Ratio 2.2 g/dL    BUN/Creatinine Ratio 16.7 7.0 - 25.0    Anion Gap 12.0 5.0 - 15.0 mmol/L    eGFR 95.1 >60.0 mL/min/1.73   CBC Auto Differential    Collection Time: 04/16/24  3:30 PM    Specimen: Arm, Left; Blood   Result Value Ref Range    WBC 6.98 3.40 - 10.80 10*3/mm3    RBC 5.16 3.77 - 5.28 10*6/mm3    Hemoglobin 15.1 12.0 - 15.9 g/dL    Hematocrit 46.8 (H) 34.0 - 46.6 %    MCV 90.7 79.0 - 97.0 fL    MCH 29.3 26.6 - 33.0 pg    MCHC 32.3 31.5 - 35.7 g/dL    RDW 12.0 (L) 12.3 - 15.4 %    RDW-SD 39.8 37.0 - 54.0 fl    MPV 10.7 6.0 - 12.0 fL    Platelets 227 140 - 450 10*3/mm3    Neutrophil % 67.1 42.7 - 76.0 %    Lymphocyte % 21.5 19.6 - 45.3 %    Monocyte % 9.0 5.0 - 12.0 %     Eosinophil % 1.6 0.3 - 6.2 %    Basophil % 0.4 0.0 - 1.5 %    Immature Grans % 0.4 0.0 - 0.5 %    Neutrophils, Absolute 4.68 1.70 - 7.00 10*3/mm3    Lymphocytes, Absolute 1.50 0.70 - 3.10 10*3/mm3    Monocytes, Absolute 0.63 0.10 - 0.90 10*3/mm3    Eosinophils, Absolute 0.11 0.00 - 0.40 10*3/mm3    Basophils, Absolute 0.03 0.00 - 0.20 10*3/mm3    Immature Grans, Absolute 0.03 0.00 - 0.05 10*3/mm3    nRBC 0.0 0.0 - 0.2 /100 WBC   Duplex Venous Lower Extremity - Bilateral    Collection Time: 04/16/24  4:56 PM   Result Value Ref Range    Right Common Femoral Spont Y     Right Common Femoral Competent Y     Right Common Femoral Phasic Y     Right Common Femoral Compress C     Right Common Femoral Augment Y     Right Saphenofemoral Junction Compress C     Right Profunda Femoral Compress C     Right Proximal Femoral Compress C     Right Mid Femoral Spont Y     Right Mid Femoral Competent Y     Right Mid Femoral Phasic Y     Right Mid Femoral Compress C     Right Mid Femoral Augment Y     Right Distal Femoral Compress C     Right Popliteal Spont Y     Right Popliteal Competent Y     Right Popliteal Phasic Y     Right Popliteal Compress C     Right Popliteal Augment Y     Right Posterior Tibial Compress C     Right Peroneal Compress C     Right Gastronemius Compress C     Right Greater Saph AK Compress C     Right Greater Saph BK Compress C     Right Lesser Saph Compress C     Left Common Femoral Spont Y     Left Common Femoral Competent Y     Left Common Femoral Phasic Y     Left Common Femoral Compress C     Left Common Femoral Augment Y     Left Saphenofemoral Junction Compress C     Left Profunda Femoral Compress C     Left Proximal Femoral Compress C     Left Mid Femoral Spont Y     Left Mid Femoral Competent Y     Left Mid Femoral Phasic Y     Left Mid Femoral Compress C     Left Mid Femoral Augment Y     Left Distal Femoral Compress C     Left Popliteal Spont Y     Left Popliteal Competent Y     Left Popliteal Phasic  Y     Left Popliteal Compress C     Left Popliteal Augment Y     Left Posterior Tibial Compress C     Left Peroneal Compress C     Left Gastronemius Compress C     Left Greater Saph AK Compress C     Left Greater Saph BK Compress C     Left Lesser Saph Compress P     BH CV VAS PRELIMINARY FINDINGS SCRIPTING 1.0     Left Lesser Saph Vessel 1.0     Left Lesser Saph Thrombus C    ECG 12 Lead Other; palpitations    Collection Time: 04/16/24  6:29 PM   Result Value Ref Range    QT Interval 359 ms    QTC Interval 437 ms         RADIOLOGY  XR Chest 1 View    Result Date: 4/16/2024  XR CHEST 1 VW-4/16/2024  HISTORY: Leg swelling.  Heart size is within normal limits. Lungs appear free of acute infiltrates. Bones and soft tissues are unremarkable except for minimal aortic calcification.      1. No acute process.   This report was finalized on 4/16/2024 4:26 PM by Dr. Moe Castillo M.D on Workstation: [id number]         MEDICATIONS GIVEN IN ER  Medications - No data to display      ORDERS PLACED DURING THIS VISIT:  Orders Placed This Encounter   Procedures    XR Chest 1 View    Comprehensive Metabolic Panel    CBC Auto Differential    Ambulatory Referral to Cardiology    Monitor Blood Pressure    Continuous Pulse Oximetry    Holter Monitor - 48 Hour    ECG 12 Lead Other; palpitations    CBC & Differential         OUTPATIENT MEDICATION MANAGEMENT:  No current Epic-ordered facility-administered medications on file.     Current Outpatient Medications Ordered in Epic   Medication Sig Dispense Refill    baclofen (LIORESAL) 10 MG tablet TAKE 1 TABLET BY MOUTH THREE TIMES DAILY      bisoprolol (ZEBeta) 10 MG tablet TAKE 1 TABLET BY MOUTH DAILY      Coenzyme Q10 (CO Q 10) 10 MG capsule Take  by mouth.      cyanocobalamin 1000 MCG/ML injection Inject 1,000 mcg under the skin.      gabapentin (NEURONTIN) 300 MG capsule TAKE 1 CAPSULE BY MOUTH TWICE DAILY      levothyroxine (SYNTHROID, LEVOTHROID) 88 MCG tablet TAKE 1 TABLET BY MOUTH  EVERY DAY      MAGNESIUM PO Take  by mouth.      montelukast (SINGULAIR) 10 MG tablet TAKE 1 TABLET BY MOUTH EVERY NIGHT AT BEDTIME      Multiple Vitamins-Minerals (WOMENS MULTIVITAMIN PO) Take  by mouth Daily.      NON FORMULARY Omega 7      Omega-3 Fatty Acids (OMEGA 3 PO) Take  by mouth Daily.      pravastatin (PRAVACHOL) 40 MG tablet Take 40 mg by mouth Daily. Pt is waiting to start medication.  12    Probiotic Product (PROBIOTIC DAILY PO) Take  by mouth.      ramipril (ALTACE) 2.5 MG capsule TAKE 1 CAPSULE BY MOUTH EVERY DAY           PROCEDURES  Procedures            PROGRESS, DATA ANALYSIS, CONSULTS, AND MEDICAL DECISION MAKING  All labs have been independently interpreted by me.  All radiology studies have been reviewed by me. All EKG's have been independently viewed and interpreted by me.  Discussion below represents my analysis of pertinent findings related to patient's condition, differential diagnosis, treatment plan and final disposition.    DIFFERENTIAL    My differential diagnosis includes but is not limited to DVT, CHF, renal disease, liver disease, venous insufficiency      ED Course as of 04/16/24 1845 Tue Apr 16, 2024   1658 Glucose: 67 [KA]   1658 Creatinine: 0.66 [KA]   1658 WBC: 6.98 [KA]   1658 Hemoglobin: 15.1 [KA]   1658 I discussed the patient with [facility] .  Bilateral lower extremity Dopplers negative for DVT [KA]   1750 My depend interpretation of the chest x-ray is no cardiomegaly, acute infiltrate or pulmonary edema. [KA]   1756 Patient has returned from ultrasound department.  I have counseled her on all of her lab and imaging studies.  No evidence of DVT, chest x-ray does not demonstrate any signs of volume overload.  Renal function normal.  She does have some elevated LFTs but that has been intermittently elevated on past lab evaluations.  Recommended continue follow-up with her PCP regarding that.  No chest pain or shortness of breath, I suspect her edema  is related to venous insufficiency.  I recommended that she monitor her dietary salt, use compression stockings and elevation and stay well-hydrated and follow-up with her PCP.  She is agreeable. [KA]   1832 EKG interpreted myself:  1829, sinus rhythm rate of 89, no acute ST segment changes or T wave inversions. [FS]   1832 Chest x-ray interpreted myself:  No infiltrate or pneumothorax [FS]   1837 Patient reports some intermittent fluttering palpitations.  EKG unremarkable, electrolytes normal, no anemia or signs of kidney disease.  Will place on a 48 Holter monitor and refer to cardiology for follow-up. Cardiology referral made. [KA]   1841 My independent interpretation of the EKG performed at 1829 is sinus rhythm rate 89 normal axis, normal intervals, no ST elevation.  No prior for comparison [KA]      ED Course User Index  [FS] Aly Diaz MD  [KA] Cassidy Jacinto PA-C             AS OF 18:45 EDT VITALS:    BP - 140/74  HR - 85  TEMP - 97.3 °F (36.3 °C)  O2 SATS - 100%    COMPLEXITY OF CARE  Admission was considered but after careful review of the patient's presentation, physical examination, diagnostic results, and response to treatment the patient may be safely discharged with outpatient follow-up.      DIAGNOSIS  Final diagnoses:   Bilateral lower extremity edema   Elevated LFTs   Palpitations         DISPOSITION  ED Disposition       ED Disposition   Discharge    Condition   Good    Comment   --                  FOLLOW UP  Sandra Horta MD  [facility] [address] [place] [address] [phone]    In 2 days      [facility] EMERGENCY DEPARTMENT  [address] [address] [phone]    If symptoms worsen or any concerns    [facility] CARDIOLOGY  [address]  [facility] [address] [phone]  Schedule an appointment as soon as possible for a visit                 Please note that portions of this document were completed  with a voice recognition program.    Note Disclaimer: At [facility], we believe that sharing information builds trust and better relationships. You are receiving this note because you recently visited [facility]. It is possible you will see health information before a provider has talked with you about it. This kind of information can be easy to misunderstand. To help you fully understand what it means for your health, we urge you to discuss this note with your provider.         Cassidy Jacinto PA-C  04/16/24 8002

## 2024-04-17 LAB
QT INTERVAL: 359 MS
QTC INTERVAL: 437 MS

## 2024-04-17 NOTE — ED PROVIDER NOTES
MD ATTESTATION NOTE    The THEO and I have discussed this patient's history, physical exam, and treatment plan.  I have reviewed the documentation and personally had a face to face interaction with the patient. I affirm the documentation and agree with the treatment and plan.  The attached note describes my personal findings.      I provided a substantive portion of the care of the patient.  I personally performed the physical exam in its entirety, and below are my findings.        Brief HPI: Patient presented emergency department with increasing lower extremity edema and palpitations.  Denies any other complaints.  No chest pain or shortness of breath.    PHYSICAL EXAM  ED Triage Vitals   Temp Heart Rate Resp BP SpO2   04/16/24 1358 04/16/24 1358 04/16/24 1358 04/16/24 1405 04/16/24 1358   97.3 °F (36.3 °C) (!) 124 18 169/84 96 %      Temp src Heart Rate Source Patient Position BP Location FiO2 (%)   -- -- -- -- --                GENERAL: no acute distress  HENT: nares patent  EYES: no scleral icterus  CV: regular rhythm, normal rate  RESPIRATORY: normal effort  ABDOMEN: soft  MUSCULOSKELETAL: no deformity, trace edema in bilateral lower extremities  NEURO: alert, moves all extremities, follows commands  PSYCH:  calm, cooperative  SKIN: warm, dry    Vital signs and nursing notes reviewed.        Plan: Patient presented emergency department with nonspecific palpitations, lower extremity edema.  Exam appears most consistent with venous insufficiency.  Labs and imaging otherwise reassuring in the ER.  No symptoms to suggest systemic volume overload.  Given palpitations will place Holter monitor and discharge with cardiology follow-up.  Symptoms may be related to her mitral valve prolapse.  Given return precautions and discharged home.    ED Course as of 04/17/24 0023   Tue Apr 16, 2024   1658 Glucose: 67 [KA]   1658 Creatinine: 0.66 [KA]   1658 WBC: 6.98 [KA]   1658 Hemoglobin: 15.1 [KA]   1658 I discussed the patient  with [facility] .  Bilateral lower extremity Dopplers negative for DVT [KA]   1750 My depend interpretation of the chest x-ray is no cardiomegaly, acute infiltrate or pulmonary edema. [KA]   1756 Patient has returned from ultrasound department.  I have counseled her on all of her lab and imaging studies.  No evidence of DVT, chest x-ray does not demonstrate any signs of volume overload.  Renal function normal.  She does have some elevated LFTs but that has been intermittently elevated on past lab evaluations.  Recommended continue follow-up with her PCP regarding that.  No chest pain or shortness of breath, I suspect her edema is related to venous insufficiency.  I recommended that she monitor her dietary salt, use compression stockings and elevation and stay well-hydrated and follow-up with her PCP.  She is agreeable. [KA]   1832 EKG interpreted myself:  1829, sinus rhythm rate of 89, no acute ST segment changes or T wave inversions. [FS]   1832 Chest x-ray interpreted myself:  No infiltrate or pneumothorax [FS]   1837 Patient reports some intermittent fluttering palpitations.  EKG unremarkable, electrolytes normal, no anemia or signs of kidney disease.  Will place on a 48 Holter monitor and refer to cardiology for follow-up. Cardiology referral made. [KA]   1841 My independent interpretation of the EKG performed at 1829 is sinus rhythm rate 89 normal axis, normal intervals, no ST elevation.  No prior for comparison [KA]      ED Course User Index  [FS] Aly Diaz MD  [KA] Cassidy Jacinto PA-C       SHARED VISIT: This visit was performed by BOTH a physician and an APC. The substantive portion of the medical decision making was performed by this attesting physician who made or approved the management plan and takes responsibility for patient management. All studies in the APC note (if performed) were independently interpreted by me.        Aly Diaz MD  04/17/24 0025

## 2024-04-19 ENCOUNTER — TRANSCRIBE ORDERS (OUTPATIENT)
Dept: ADMINISTRATIVE | Facility: HOSPITAL | Age: 69
End: 2024-04-19
Payer: MEDICARE

## 2024-04-19 DIAGNOSIS — R01.1 HEART MURMUR: Primary | ICD-10-CM

## 2024-04-20 PROCEDURE — 93227 XTRNL ECG REC<48 HR R&I: CPT | Performed by: INTERNAL MEDICINE

## 2024-04-25 ENCOUNTER — HOSPITAL ENCOUNTER (OUTPATIENT)
Dept: CARDIOLOGY | Facility: HOSPITAL | Age: 69
Discharge: HOME | End: 2024-04-25
Admitting: INTERNAL MEDICINE
Payer: MEDICARE

## 2024-04-25 VITALS
HEART RATE: 78 BPM | WEIGHT: 174 LBS | DIASTOLIC BLOOD PRESSURE: 76 MMHG | BODY MASS INDEX: 29.71 KG/M2 | SYSTOLIC BLOOD PRESSURE: 136 MMHG | HEIGHT: 64 IN

## 2024-04-25 DIAGNOSIS — R01.1 HEART MURMUR: ICD-10-CM

## 2024-04-25 LAB
AORTIC DIMENSIONLESS INDEX: 0.9 (DI)
ASCENDING AORTA: 2.7 CM
BH CV ECHO MEAS - ACS: 1.59 CM
BH CV ECHO MEAS - AO MAX PG: 5 MMHG
BH CV ECHO MEAS - AO MEAN PG: 2.5 MMHG
BH CV ECHO MEAS - AO ROOT DIAM: 2.41 CM
BH CV ECHO MEAS - AO V2 MAX: 111.5 CM/SEC
BH CV ECHO MEAS - AO V2 VTI: 22.2 CM
BH CV ECHO MEAS - AVA(I,D): 2.22 CM2
BH CV ECHO MEAS - EDV(CUBED): 80.3 ML
BH CV ECHO MEAS - EDV(MOD-SP4): 70 ML
BH CV ECHO MEAS - EF(MOD-SP4): 62.9 %
BH CV ECHO MEAS - ESV(CUBED): 21.9 ML
BH CV ECHO MEAS - ESV(MOD-SP4): 26 ML
BH CV ECHO MEAS - FS: 35.2 %
BH CV ECHO MEAS - IVS/LVPW: 0.96 CM
BH CV ECHO MEAS - IVSD: 0.71 CM
BH CV ECHO MEAS - LAT PEAK E' VEL: 11.2 CM/SEC
BH CV ECHO MEAS - LV MASS(C)D: 93.1 GRAMS
BH CV ECHO MEAS - LV MAX PG: 3.7 MMHG
BH CV ECHO MEAS - LV MEAN PG: 1.82 MMHG
BH CV ECHO MEAS - LV V1 MAX: 96.3 CM/SEC
BH CV ECHO MEAS - LV V1 VTI: 20.3 CM
BH CV ECHO MEAS - LVIDD: 4.3 CM
BH CV ECHO MEAS - LVIDS: 2.8 CM
BH CV ECHO MEAS - LVOT AREA: 2.42 CM2
BH CV ECHO MEAS - LVOT DIAM: 1.76 CM
BH CV ECHO MEAS - LVPWD: 0.74 CM
BH CV ECHO MEAS - MED PEAK E' VEL: 9.7 CM/SEC
BH CV ECHO MEAS - MR MAX PG: 19.4 MMHG
BH CV ECHO MEAS - MR MAX VEL: 220.1 CM/SEC
BH CV ECHO MEAS - MV A DUR: 0.12 SEC
BH CV ECHO MEAS - MV A MAX VEL: 112.1 CM/SEC
BH CV ECHO MEAS - MV DEC SLOPE: 680.2 CM/SEC2
BH CV ECHO MEAS - MV DEC TIME: 0.16 SEC
BH CV ECHO MEAS - MV E MAX VEL: 102 CM/SEC
BH CV ECHO MEAS - MV E/A: 0.91
BH CV ECHO MEAS - MV MAX PG: 5.6 MMHG
BH CV ECHO MEAS - MV MEAN PG: 2.11 MMHG
BH CV ECHO MEAS - MV P1/2T: 48.4 MSEC
BH CV ECHO MEAS - MV V2 VTI: 32.1 CM
BH CV ECHO MEAS - MVA(P1/2T): 4.5 CM2
BH CV ECHO MEAS - MVA(VTI): 1.53 CM2
BH CV ECHO MEAS - PA ACC TIME: 0.13 SEC
BH CV ECHO MEAS - PA V2 MAX: 95.6 CM/SEC
BH CV ECHO MEAS - PULM A REVS DUR: 0.1 SEC
BH CV ECHO MEAS - PULM A REVS VEL: 41 CM/SEC
BH CV ECHO MEAS - PULM DIAS VEL: 58 CM/SEC
BH CV ECHO MEAS - PULM S/D: 1.07
BH CV ECHO MEAS - PULM SYS VEL: 61.8 CM/SEC
BH CV ECHO MEAS - RAP SYSTOLE: 3 MMHG
BH CV ECHO MEAS - RV MAX PG: 1.99 MMHG
BH CV ECHO MEAS - RV V1 MAX: 70.6 CM/SEC
BH CV ECHO MEAS - RV V1 VTI: 15 CM
BH CV ECHO MEAS - RVSP: 17 MMHG
BH CV ECHO MEAS - SUP REN AO DIAM: 1.9 CM
BH CV ECHO MEAS - SV(LVOT): 49.2 ML
BH CV ECHO MEAS - SV(MOD-SP4): 44 ML
BH CV ECHO MEAS - TR MAX PG: 13.6 MMHG
BH CV ECHO MEAS - TR MAX VEL: 184.2 CM/SEC
BH CV ECHO MEASUREMENTS AVERAGE E/E' RATIO: 9.76
BH CV XLRA - RV BASE: 2.8 CM
BH CV XLRA - RV LENGTH: 5.2 CM
BH CV XLRA - RV MID: 2.35 CM
BH CV XLRA - TDI S': 11.2 CM/SEC
SINUS: 2.34 CM
STJ: 2.44 CM

## 2024-04-25 PROCEDURE — 93306 TTE W/DOPPLER COMPLETE: CPT

## 2024-05-01 ENCOUNTER — OFFICE VISIT (OUTPATIENT)
Dept: CARDIOLOGY | Facility: CLINIC | Age: 69
End: 2024-05-01
Payer: MEDICARE

## 2024-05-01 ENCOUNTER — LAB (OUTPATIENT)
Dept: LAB | Facility: HOSPITAL | Age: 69
End: 2024-05-01
Payer: MEDICARE

## 2024-05-01 VITALS
OXYGEN SATURATION: 98 % | WEIGHT: 122 LBS | HEART RATE: 94 BPM | SYSTOLIC BLOOD PRESSURE: 140 MMHG | DIASTOLIC BLOOD PRESSURE: 80 MMHG | BODY MASS INDEX: 20.83 KG/M2 | HEIGHT: 64 IN

## 2024-05-01 DIAGNOSIS — E78.2 MIXED HYPERLIPIDEMIA: ICD-10-CM

## 2024-05-01 DIAGNOSIS — Z85.3 HISTORY OF BREAST CANCER: Primary | ICD-10-CM

## 2024-05-01 DIAGNOSIS — I10 PRIMARY HYPERTENSION: ICD-10-CM

## 2024-05-01 PROBLEM — E03.9 HYPOTHYROIDISM (ACQUIRED): Status: ACTIVE | Noted: 2024-05-01

## 2024-05-01 LAB
CHOLEST SERPL-MCNC: 274 MG/DL (ref 0–200)
HDLC SERPL-MCNC: 75 MG/DL (ref 40–60)
LDLC SERPL CALC-MCNC: 184 MG/DL (ref 0–100)
LDLC/HDLC SERPL: 2.41 {RATIO}
TRIGL SERPL-MCNC: 92 MG/DL (ref 0–150)
VLDLC SERPL-MCNC: 15 MG/DL (ref 5–40)

## 2024-05-01 PROCEDURE — 36415 COLL VENOUS BLD VENIPUNCTURE: CPT

## 2024-05-01 PROCEDURE — 80061 LIPID PANEL: CPT

## 2024-05-01 PROCEDURE — 83695 ASSAY OF LIPOPROTEIN(A): CPT

## 2024-05-01 NOTE — PROGRESS NOTES
CARDIOLOGY    Shree Yepez MD    ENCOUNTER DATE:  05/01/2024    Lindsay Meier / 68 y.o. / female        CHIEF COMPLAINT / REASON FOR OFFICE VISIT     Palpitations      HISTORY OF PRESENT ILLNESS       HPI    Lindsay Meier is a 68 y.o. female with hypertension, hyperlipidemia, hypothyroidism, NAFLD. fibromyalgia, breast cancer who presents to [facility] care as a new patient.    Patient had leg edema last month. She raised her leg and the swelling resolved the next morning. She nonetheless presented to ED at the urging of her PCP. Work-up including echo and venous US were largely normal apart from findings of phlebitis.    Today, patient has no acute complaints. Reports that she had intermittent had ankle swelling over the years. Retired dental assistant. Not physically active in recent years in the setting of a bad break-up and weight loss. Intermittent palpitation since a teenager. Questionable exertional dyspnea depending on the weather. Denies chest pain, orthopnea, PND. Occasional dizziness, denies syncope, bleeding, or unexpected falls. Does not recall having had a heart attack or stroke. Former smoker for 10 years while young, denies alcohol or substance.    REVIEW OF SYSTEMS     Review of Systems   Constitutional: Negative for weight loss.   Cardiovascular:  Positive for leg swelling. Negative for chest pain, dyspnea on exertion, orthopnea, palpitations, paroxysmal nocturnal dyspnea and syncope.   Respiratory:  Negative for shortness of breath.    Hematologic/Lymphatic: Negative for bleeding problem.   Musculoskeletal:  Negative for falls.   Gastrointestinal:  Negative for hematochezia and melena.   Genitourinary:  Negative for hematuria.   Neurological:  Negative for dizziness and light-headedness.   Psychiatric/Behavioral:  Negative for altered mental status.            MEDICATIONS      Outpatient Encounter Medications as of 5/1/2024   Medication Sig Dispense Refill    baclofen (LIORESAL) 10 MG tablet  "TAKE 1 TABLET BY MOUTH THREE TIMES DAILY      Coenzyme Q10 (CO Q 10) 10 MG capsule Take  by mouth.      levothyroxine (SYNTHROID, LEVOTHROID) 88 MCG tablet TAKE 1 TABLET BY MOUTH EVERY DAY      MAGNESIUM PO Take  by mouth.      Multiple Vitamins-Minerals (WOMENS MULTIVITAMIN PO) Take  by mouth Daily.      NON FORMULARY Omega 7      Omega-3 Fatty Acids (OMEGA 3 PO) Take  by mouth Daily.      ramipril (ALTACE) 2.5 MG capsule TAKE 1 CAPSULE BY MOUTH EVERY DAY      bisoprolol (ZEBeta) 10 MG tablet TAKE 1 TABLET BY MOUTH DAILY      cyanocobalamin 1000 MCG/ML injection Inject 1,000 mcg under the skin.      gabapentin (NEURONTIN) 300 MG capsule TAKE 1 CAPSULE BY MOUTH TWICE DAILY      montelukast (SINGULAIR) 10 MG tablet TAKE 1 TABLET BY MOUTH EVERY NIGHT AT BEDTIME      pravastatin (PRAVACHOL) 40 MG tablet Take 40 mg by mouth Daily. Pt is waiting to start medication.  12    Probiotic Product (PROBIOTIC DAILY PO) Take  by mouth.       No facility-administered encounter medications on file as of 5/1/2024.         VITAL SIGNS     Visit Vitals  /80 (BP Location: Left arm, Patient Position: Sitting, Cuff Size: Adult)   Pulse 94   Ht 162.6 cm (64\")   Wt 55.3 kg (122 lb)   SpO2 98%   BMI 20.94 kg/m²         Wt Readings from Last 3 Encounters:   05/01/24 55.3 kg (122 lb)   04/25/24 78.9 kg (174 lb)   11/09/20 78.9 kg (174 lb)     Body mass index is 20.94 kg/m².      PHYSICAL EXAMINATION     Vitals and nursing note reviewed.   Constitutional:       Appearance: Not in distress.   Neck:      Vascular: No JVR.   Pulmonary:      Effort: Pulmonary effort is normal.      Breath sounds: Normal breath sounds. No wheezing. No rhonchi. No rales.   Cardiovascular:      Normal rate. Regular rhythm.      Murmurs: There is no murmur.   Edema:     Peripheral edema absent.   Abdominal:      General: There is no distension.      Palpations: Abdomen is soft.      Tenderness: There is no abdominal tenderness.   Musculoskeletal:      Cervical " "back: Neck supple. Skin:     General: Skin is cool.   Neurological:      Mental Status: Alert and oriented to person, place and time.           REVIEWED DATA     Procedures      Lab Results   Component Value Date    WBC 6.98 04/16/2024    HGB 15.1 04/16/2024    HCT 46.8 (H) 04/16/2024    MCV 90.7 04/16/2024     04/16/2024       No results found for: \"HGBA1C\"    Lab Results   Component Value Date    GLUCOSE 67 04/16/2024    BUN 11 04/16/2024    CREATININE 0.66 04/16/2024    EGFR 95.1 04/16/2024    BCR 16.7 04/16/2024    K 3.4 (L) 04/16/2024    CO2 26.0 04/16/2024    CALCIUM 9.5 04/16/2024    ALBUMIN 4.9 04/16/2024    BILITOT 0.3 04/16/2024    AST 44 (H) 04/16/2024    ALT 60 (H) 04/16/2024       Lab Results   Component Value Date    CHLPL 258 (H) 07/03/2018    TRIG 118 07/03/2018    HDL 48 (L) 07/03/2018     (H) 07/03/2018       Results for orders placed during the hospital encounter of 04/25/24    Adult Transthoracic Echo Complete W/ Cont if Necessary Per Protocol    Interpretation Summary    Left ventricular ejection fraction appears to be 56 - 60%.    Left ventricular diastolic function is consistent with (grade I) impaired relaxation which may be normal for age.    Estimated right ventricular systolic pressure from tricuspid regurgitation is normal (<35 mmHg).    No significant valvular abnormalities.        ASSESSMENT & PLAN     Diagnoses and all orders for this visit:    1. History of breast cancer (Primary)    2. Primary hypertension    3. Mixed hyperlipidemia       Leg edema: Pt was seen in the leg last month for worsening leg edema which improved with leg elevation. Echo this past month 4/2024 essentially normal. Venous duplex was negative for DVT and showed chronic phlebitis. She does not have CHF symptoms and appears euvolemic on exam. No further work-up needed in that regard.  Palpitation: Longstanding intermittent palpitation. Holter monitor last month was normal. Previously on bisoprolol 10 " daily but pt self-discontinued a year ago; she was resumed on metoprolol of unknown dose for about 2 weeks.  I instructed patient to go home and call us back to verify her current medications as our list is inaccurate at this time.  Hypertension: In office /80, elevated.  Previously on bisoprolol 10 daily and ramipril 2.5 daily a year ago which were stopped apparently for improved readings.  She is unsure about her present medications and will call us back to reconcile after going home.  Also encouraged home vital monitoring to help guide the need for therapy reinitiation.  Hyperlipidemia/ASCVD risk: Last lipids in 2018.  Not taking pravastatin 40 daily due to intolerance and NAFLT. Will repeat lipids now along with LP(a) and also arrange for coronary calcium score to help with risk stratification and guide the need for alternative lipid lowering therapy.  Hypothyroidism: Management per primary team and other specialists.  NAFLT: Management per primary team and other specialists.  History of breast cancer: diagnosed in 2017 s/p R lumpectomy and XRT. In remission.     I spent 30 minutes caring for Lindsay on this date of service. This time includes time spent by me in the following activities: preparing for the visit, reviewing tests, obtaining and/or reviewing a separately obtained history, performing a medically appropriate examination and/or evaluation, counseling and educating the patient/family/caregiver, and ordering medications, tests, or procedures.     Shree Yepez MD. PhD. [facility]  05/01/24  14:29 EDT    Part of this note may be an electronic transcription/translation of spoken language to printed text using the Dragon dictation system.

## 2024-05-02 ENCOUNTER — TELEPHONE (OUTPATIENT)
Dept: CARDIOLOGY | Facility: HOSPITAL | Age: 69
End: 2024-05-02
Payer: MEDICARE

## 2024-05-02 LAB — LPA SERPL-SCNC: 41.9 NMOL/L

## 2024-05-02 NOTE — TELEPHONE ENCOUNTER
Results and recommendations reviewed with the patient. Patient verbalized understanding. Denied further questions at this time.    Cari Lara RN  Triage [facility]      Dr. Yepez,  I updated her med list with what she is taking and discontinued the others.  She is going to restart her ramipril today.  She also takes milk thistle/dandelion supplement daily that I was unable to add.    Please forward any follow up to triage also.    Thank you,  Cari Lara RN  [facility]  [facility]

## 2024-05-02 NOTE — TELEPHONE ENCOUNTER
----- Message from Cari SHERRILL sent at 5/1/2024  9:04 PM EDT -----  Lipids very elevated and comparable to her prior levels 5 years ago, I would strongly consider starting her on a statin.  She was hesitant at the time of the visit today due to her nonalcoholic fatty liver disease, I am still waiting on the LP(a) result for further risk stratification but in the meantime please inquire about her exact home medications as she was not sure of them at the time of visit but they were definitely different from what we have in the computer.

## 2024-05-07 ENCOUNTER — TELEPHONE (OUTPATIENT)
Dept: CARDIOLOGY | Facility: CLINIC | Age: 69
End: 2024-05-07
Payer: MEDICARE